# Patient Record
Sex: MALE | Race: WHITE | NOT HISPANIC OR LATINO | Employment: FULL TIME | URBAN - METROPOLITAN AREA
[De-identification: names, ages, dates, MRNs, and addresses within clinical notes are randomized per-mention and may not be internally consistent; named-entity substitution may affect disease eponyms.]

---

## 2017-11-11 ENCOUNTER — HOSPITAL ENCOUNTER (EMERGENCY)
Facility: CLINIC | Age: 27
Discharge: HOME | End: 2017-11-11
Attending: EMERGENCY MEDICINE | Admitting: EMERGENCY MEDICINE
Payer: COMMERCIAL

## 2017-11-11 VITALS
SYSTOLIC BLOOD PRESSURE: 153 MMHG | RESPIRATION RATE: 16 BRPM | BODY MASS INDEX: 27.59 KG/M2 | HEIGHT: 74 IN | WEIGHT: 215 LBS | HEART RATE: 80 BPM | DIASTOLIC BLOOD PRESSURE: 58 MMHG | TEMPERATURE: 98 F | OXYGEN SATURATION: 98 %

## 2017-11-11 DIAGNOSIS — S01.81XA FACIAL LACERATION, INITIAL ENCOUNTER: ICD-10-CM
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
## 2017-11-11 PROCEDURE — 12052 INTMD RPR FACE/MM 2.6-5.0 CM: CPT | Performed by: EMERGENCY MEDICINE

## 2017-11-11 PROCEDURE — 12052 INTMD RPR FACE/MM 2.6-5.0 CM: CPT | Mod: Z6 | Performed by: EMERGENCY MEDICINE

## 2017-11-11 PROCEDURE — 99282 EMERGENCY DEPT VISIT SF MDM: CPT | Mod: 25 | Performed by: EMERGENCY MEDICINE

## 2017-11-11 RX ORDER — LIDOCAINE HYDROCHLORIDE 10 MG/ML
INJECTION, SOLUTION EPIDURAL; INFILTRATION; INTRACAUDAL; PERINEURAL
Status: DISCONTINUED
Start: 2017-11-11 | End: 2017-11-11 | Stop reason: HOSPADM

## 2017-11-11 RX ORDER — MAGNESIUM HYDROXIDE 1200 MG/15ML
LIQUID ORAL
Status: DISCONTINUED
Start: 2017-11-11 | End: 2017-11-11 | Stop reason: HOSPADM

## 2017-11-11 NOTE — ED PROVIDER NOTES
"    [place] EMERGENCY DEPARTMENT ([facility])    History     Chief Complaint   Patient presents with     Facial Laceration     HPI  Kwasi Castellanos is a 27 year old otherwise healthy male who presents for evaluation of a facial laceration. Patient reports he was playing rugby around 2 PM when he took a hit from another player and sustained a laceration to the right side of his face, just lateral to the corner of his right eye. Immediately after the injury the patient did have clotting powder placed on the laceration. He denies neck pain, headache, vision changes, chest pain. No focal neurologic deficits. He does have a contusion inferior to his right eye where he was hit, but denies any other injuries. Patient states his tetanus immunization was updated within the last 1-2 years while in the [employer].     I have reviewed the Medications, Allergies, Past Medical and Surgical History, and Social History in the Epic system.     Allergies   Allergen Reactions     Penicillins      PMHx: Reviewed with patient and is negative.    Family Hx: Reviewed with patient and is non-contributory to today's encounter.    Social History   Substance Use Topics     Smoking status: Never Smoker     Smokeless tobacco: Not on file     Alcohol use Yes       Review of Systems  ROS: 14 point ROS neg other than the symptoms noted above in the HPI.    Physical Exam   BP: 153/58  Pulse: 80  Temp: 98  F (36.7  C)  Resp: 16  Height: 188 cm (6' 2\")  Weight: 97.5 kg (215 lb)  SpO2: 98 %    Physical Exam  GENERAL: Well-appearing, no acute distress.  HENT:    Head: Normocephalic. 3.25cm laceration to the right face just lateral to the right eye/lateral canthal fold without lid involvement.   Ears: External ears normal, hearing grossly intact.   Nose: No external deformities.   Throat/Mouth: Moist oral mucosa. Posterior oropharynx is clear.  EYES: Pupils equal, round, reactive. No conjunctival pallor, sclerae clear. EOMI.  CV: Regular rate, " regular rhythm. Warm and well perfused.  PULMONARY: Effort normal. No accessory muscle use. Good air movement. No stridor. Lung sounds clear bilaterally with no wheezing, rhonchi, or rales.  GI: Soft, non-distended, non-tender to palpation.  NEURO: Alert, awake. Oriented x3. No focal sensory loss or muscular weakness. No facial drooping, no slurring of speech.  MSK:    Neck: Supple.   Extremities: No gross deformity. Coordinated movement of all extremities.  INTEGUMENTARY: Warm. No diaphoresis. No rashes, jaundice, or ecchymoses. Laceration on face as noted above.  PSYCH: Appropriate affect. Behavior is normal. Linear thought process. Normal insight.    ED Course     Procedures  5:47 PM  The patient was seen and examined by Dr. Trujillo in Room 15.   [facility] Procedure Note        Laceration Repair:    Performed by: Brien Trujillo  Authorized by: Brien Trujillo  Consent given by: Patient who states understanding of the procedure being performed after discussing the risks, benefits and alternatives.    Preparation: Patient was prepped and draped in usual sterile fashion.  Irrigation solution: saline    Body area:face  Laceration length: 3.25cm  Contamination: The wound is not contaminated.  Foreign bodies:none  Tendon involvement: none  Anesthesia: Local  Local anesthetic: Lidocaine     1%  Anesthetic total: 10ml    Debridement: none  Skin closure: Closed with 6 x 6.0 Ethilon  Technique: interrupted  Approximation: close  Approximation difficulty: intermediate (layered closure or heavily contaminated)    Patient tolerance: Patient tolerated the procedure well with no immediate complications.  Critical Care time:  None    Assessments & Plan (with Medical Decision Making)   Primary Evaluation:  Patient was seen and examined in the Emergency Department and was noted to be in no acute distress. Initial vital signs demonstrated HTN. Airway was patent and protected. Breathing was intact.  Hemodynamics were stable.    Patient's history was reviewed in the chart and with the patient.    MDM and Disposition:  Patient presented for evaluation of a right face laceration sustained a few hours ago while playing rugby. Laceration was noted just lateral to the right eye without eye or lid involvement. The patient had no LOC or other concerning signs/symptoms that would indicate the presence of intracranial pathology. No imaging was felt to be indicated. The patient's tetanus was updated 1-2 years ago while the patient was in the .    The wound was anesthetized, irrigated, explored, and repaired as documented above. There were no complications or difficulties. VA was intact, as were EOM and pupillary response. No evidence of hyphema.    Given the patient's clinical history, physical exam, and results of our diagnostic work-up, the decision was made to discharge the patient to home in good condition after all results were discussed and all questions were answered. Discussed the plan with the patient, including complications and follow up. Strict return to ED precautions were given. All who were present expressed understanding and agreement with the diagnosis, treatment, and plan.  The patient is to follow up with his PCP or the ED in 5 days for suture removal.    Patient remained hemodynamically stable throughout his stay in the Emergency Department.    Final Clinical Impression:  Facial laceration    I have reviewed the nursing notes.  I have reviewed the findings, diagnosis, plan and need for follow up with the patient.  Brien Trujillo DO  Emergency Medicine  Pager: [phone]    Final diagnoses:   Facial laceration, initial encounter   MERLINE, Larissa Thomas, am serving as a trained medical scribe to document services personally performed by Papa Dial DO, based on the provider's statements to me.   Papa RICK DO, was physically present and have reviewed and verified the  accuracy of this note documented by Larissa Thomas.      11/11/2017   [facility], [place], EMERGENCY DEPARTMENT     Brien Trujillo MD  12/04/17 5212

## 2017-11-11 NOTE — ED AVS SNAPSHOT
[facility], [place], Emergency Department    [address] [address]    Phone:  [phone]                                       Kwasi Castellanos   MRN: [id number]    Department:  [facility], Emergency Department   Date of Visit:  11/11/2017           After Visit Summary Signature Page     I have received my discharge instructions, and my questions have been answered. I have discussed any challenges I see with this plan with the nurse or doctor.    ..........................................................................................................................................  Patient/Patient Representative Signature      ..........................................................................................................................................  Patient Representative Print Name and Relationship to Patient    ..................................................               ................................................  Date                                            Time    ..........................................................................................................................................  Reviewed by Signature/Title    ...................................................              ..............................................  Date                                                            Time

## 2017-11-11 NOTE — ED NOTES
"Triage Assessment & Note:    /58  Pulse 80  Temp 98  F (36.7  C) (Oral)  Resp 16  Ht 1.88 m (6' 2\")  Wt 97.5 kg (215 lb)  SpO2 98%  BMI 27.6 kg/m2    Patient presents with: c/o laceration lateral to the right eye. PT states he was playing rugby and was hit in the head by a head. PT denies LOC. PT also reports having clotting powder placed on the wound, bleeding is controlled without intervention at this time. PT is A&Ox4 ABC's intact.     Home Treatments/Remedies: None    Febrile / Afebrile? Afebrile    Duration of C/o: 1500 on set    Maxwell Goode  November 11, 2017      "

## 2017-11-12 NOTE — DISCHARGE INSTRUCTIONS
*LACERATION (All: sutures, staples, tape, glue)  A laceration is a cut through the skin. This will usually require stitches (sutures) or staples if it is deep. Minor cuts may be treated with a tape closure ( Steri-Strips ) or Dermabond skin glue.    HOME CARE:  1. EXTREMITY, FACE or TRUNK WOUNDS: Keep the wound clean and dry. If a bandage was applied and it becomes wet or dirty, replace it. Otherwise, leave it in place for the first 24 hours.    If stitches or staples were used, clean the wound daily. Protect the wound from sunlight and tanning lamps.    After removing the bandage, wash the area with soap and water. Use a wet cotton swab (Q tip) to loosen and remove any blood or crust that forms.    After cleaning, apply a thin layer of Polysporin or Bacitracin ointment. This will keep the wound clean and make it easier to remove the stitches or staples. Reapply a fresh bandage.    You may remove the bandage to shower as usual after the first 24 hours, but do not soak the area in water (no swimming) until the stitches or staples are removed.    If Steri-Strips were used, keep the area clean and dry. If it becomes wet, blot it dry with a towel. It is okay to take a brief shower, but avoid scrubbing the area.    If Dermabond skin adhesive was used, do not scratch, rub or pick at the adhesive film. Do not place tape directly over the film. Do not apply liquid, ointment or creams to the wound while the film is in place. Do not clean the wound with peroxide and do not apply ointments. Avoid activities that cause heavy sweating until the film has fallen off. Protect the wound from prolonged exposure to sunlight or tanning lamps. You may shower as usual but do not soak the wound in water (no baths or swimming). The film will fall off by itself in 5-10 days.  2. SCALP WOUNDS: During the first two days, you may carefully rinse your hair in the shower to remove blood, glass or dirt particles. After two days, you may  shower and shampoo your hair normally. Do not soak your scalp in the tub or go swimming until the stitches or staples have been removed.  3. MOUTH WOUNDS: Eat soft foods to reduce pain. If the cut is inside of your mouth, clean by rinsing after each meal and at bedtime with a mixture of equal parts water and Hydrogen Peroxide (do not swallow!). Or, you can use a cotton swab to directly apply Hydrogen Peroxide onto the cut.  4. You may use acetaminophen (Tylenol) 650-1000 mg every 6 hours or ibuprofen (Motrin, Advil) 600 mg every 6-8 hours with food to control pain, if you are able to take these medicines. [NOTE: If you have chronic liver or kidney disease or ever had a stomach ulcer or GI bleeding, talk with your doctor before using these medicines.]  Use sunscreen on the area for 6 months after the wound heals to keep the scar from getting darker.   FOLLOW UP: Most skin wounds heal within ten days. Mouth and facial wounds heal within five days. However, even with proper treatment, a wound infection may sometimes occur. Therefore, you should check the wound daily for signs of infection listed below.  Stitches should be removed from the face within five days. Unless you are told to come back to the emergency room, you may have your doctor or urgent care remove the stitches. If dissolving stitches were used in the mouth, these will fall out or dissolve without the need for removal. If tape closures ( Steri-Strips ) were used, remove them yourself if they have not fallen off after 7 days. If Dermabond skin glue was used, the film will fall off by itself in 5-10 days.   GET PROMPT MEDICAL ATTENTION if any of the following occur:    Increasing pain in the wound    Redness, swelling or pus coming from the wound    Fever over 101 F (38.3 C) oral    If stitches or staples come apart or fall out or if Steri-Strips fall off before seven days    If the wound edges re-open    Bleeding not controlled by direct pressure     7747-4091 [employer], [address] [place] [address], [place] [address]. All rights reserved. This information is not intended as a substitute for professional medical care. Always follow your healthcare professional's instructions.

## 2021-09-30 ENCOUNTER — HOSPITAL ENCOUNTER (EMERGENCY)
Facility: CLINIC | Age: 31
Discharge: HOME | End: 2021-09-30
Attending: EMERGENCY MEDICINE | Admitting: EMERGENCY MEDICINE
Payer: OTHER MISCELLANEOUS

## 2021-09-30 VITALS
RESPIRATION RATE: 16 BRPM | DIASTOLIC BLOOD PRESSURE: 95 MMHG | SYSTOLIC BLOOD PRESSURE: 112 MMHG | HEART RATE: 76 BPM | BODY MASS INDEX: 29.52 KG/M2 | HEIGHT: 74 IN | WEIGHT: 230 LBS | OXYGEN SATURATION: 98 % | TEMPERATURE: 100.2 F

## 2021-09-30 DIAGNOSIS — S71.151A: ICD-10-CM

## 2021-09-30 DIAGNOSIS — S51.851A: ICD-10-CM

## 2021-09-30 DIAGNOSIS — R53.83 FATIGUE: ICD-10-CM
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
## 2021-09-30 PROCEDURE — 99283 EMERGENCY DEPT VISIT LOW MDM: CPT

## 2021-09-30 PROCEDURE — 99284 EMERGENCY DEPT VISIT MOD MDM: CPT | Performed by: EMERGENCY MEDICINE

## 2021-09-30 RX ORDER — SULFAMETHOXAZOLE/TRIMETHOPRIM 800-160 MG
1 TABLET ORAL 2 TIMES DAILY
Qty: 14 TABLET | Refills: 0 | Status: ON HOLD | OUTPATIENT
Start: 2021-09-30 | End: 2021-10-11

## 2021-09-30 RX ORDER — CLINDAMYCIN HCL 150 MG
450 CAPSULE ORAL 3 TIMES DAILY
Qty: 63 CAPSULE | Refills: 0 | Status: ON HOLD | OUTPATIENT
Start: 2021-09-30 | End: 2021-10-11

## 2021-09-30 ASSESSMENT — ENCOUNTER SYMPTOMS
DIARRHEA: 1
RHINORRHEA: 0
SORE THROAT: 0
FEVER: 1
HEADACHES: 1
MYALGIAS: 1
DYSURIA: 0
WEAKNESS: 1
BLOOD IN STOOL: 0

## 2021-09-30 ASSESSMENT — MIFFLIN-ST. JEOR: SCORE: 2078.02

## 2021-09-30 NOTE — DISCHARGE INSTRUCTIONS
Pt called in worried because she took tomorrow mornings pills instead of todays afternoon pills, she wasn't having any sx but was just wanting to be cautious.l Dr. Mendez said that most of the meds she took is suppose to be twice a day anyways she just took them a couple hours earlier and that she more than likely would be fine. I told her that if she feels anything to lie down or if It became unbearable to go to the ER. Also advised her to not take anymore cardiac meds this afternoon.  
Thank you for coming to the [facility] Emergency Department.     Please return to the ER if worsening in the next few days, with high fevers, shaking chills, weakness despite starting antibiotics.     OK to use over the counter tylenol or ibuprofen according to package instructions for fever, headache or body aches.   
162.56

## 2021-09-30 NOTE — ED TRIAGE NOTES
Triage Assessment & Note:    Patient presents with: PT reports he was bit by a dog 6 days ago on the right arm and right leg. PT reports dog was up to date on shots. Pt today reports fever body aches and fatigue. PT is vaccinated for Covid.     Home Treatments/Remedies: None    Febrile / Afebrile? febrile     Duration of C/o: 3 days     Maxwell Goode RN  September 30, 2021

## 2021-09-30 NOTE — ED PROVIDER NOTES
[place] EMERGENCY DEPARTMENT ([facility])  9/30/21    History     Chief Complaint   Patient presents with     Dog Bite     The history is provided by the patient.     Kwasi Castellanos is a 31 year old male who presents to the Emergency Department with generalized weakness and fatigue. 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 Patient reports he then went out of town to play rugby and began feeling generally sore the next few days, thinking this was due to playing rugby. He states he continued feeling increased soreness. Patient reports subjective fever beginning Wednesday, 9/29. He then began feeling generally weak. Patient reports he got a COVID test and this was negative. Patient reports his manager went and checked to make sure the dog was up to date on its vaccinations. Patient denies drainage from site of bites. Patient endorses headache. No sore throat, rhinorrhea, or congestion. Patient notes some diarrhea. No blood in the stool. No dysuria. Patient reports receiving his last Tdap within the past 10 years while he was in the [employer].    Past Medical History  No past medical history on file.  No past surgical history on file.  clindamycin (CLEOCIN) 150 MG capsule  sulfamethoxazole-trimethoprim (BACTRIM DS) 800-160 MG tablet      Allergies   Allergen Reactions     Penicillins Hives     Family History  No family history on file.  Social History   Social History     Tobacco Use     Smoking status: Never Smoker     Smokeless tobacco: Never Used   Substance Use Topics     Alcohol use: Not on file     Drug use: Not on file      Past medical history, past surgical history, medications, allergies, family history, and social history were reviewed with the patient. No additional pertinent items.       Review of Systems   Constitutional: Positive for fever.   HENT: Negative for  "congestion, rhinorrhea and sore throat.    Gastrointestinal: Positive for diarrhea. Negative for blood in stool.   Genitourinary: Negative for dysuria.   Musculoskeletal: Positive for myalgias.   Neurological: Positive for weakness (generalized) and headaches.   All other systems reviewed and are negative.    A complete review of systems was performed with pertinent positives and negatives noted in the HPI, and all other systems negative.    Physical Exam   BP: (!) 112/95  Pulse: 76  Temp: 100.2  F (37.9  C)  Resp: 16  Height: 188 cm (6' 2\")  Weight: 104.3 kg (230 lb)  SpO2: 98 %     Physical Exam  Gen:A&Ox3, no acute distress  HEENT:PERRL, no facial tenderness or wounds, head atraumatic, oropharynx clear, mucous membranes moist, TMs clear bilaterally  Neck:no bony tenderness or step offs, no JVD, trachea midline, no cervical lymphadenopathy  Back: no CVA tenderness, no midline bony tenderness  CV:RRR without murmurs  PULM:Clear to auscultation bilaterally  Abd:soft, nontender, nondistended. Bowel sounds present and normal  UE: scabbed bite site right forearm with mild surrounding erythema.  No purulent drainage.  No streaking.  No crepitus or significant muscle tenderness to suggest deep space infection.  Normal perfusion  LE: Right posterior thigh with multiple puncture wounds.  There is associated bruising.  No significant erythema.  No drainage.  No compartment tenderness or crepitus.  Normal perfusion  ED Course   11:21 AM  The patient was seen and examined by Vidhi Massey MD in [facility].      Procedures         No results found for any visits on 09/30/21.  Medications - No data to display     Assessments & Plan (with Medical Decision Making)   32 yo M presenting with fever. Had a recent dog bite without any post-bite infection prophylaxis, but wounds appear to be healing. Forearm wound with mild erythema is likely source. No other localizing infectious symptoms and recent negative COVID-19 test.   Vitals " otherwise stable.  Started on a course of clindamycin and bactrim. Hx of allergy to PCNs.   Discharged with follow up with primary care.     I have reviewed the nursing notes. I have reviewed the findings, diagnosis, plan and need for follow up with the patient.    Discharge Medication List as of 9/30/2021 11:43 AM      START taking these medications    Details   clindamycin (CLEOCIN) 150 MG capsule Take 3 capsules (450 mg) by mouth 3 times daily for 7 days, Disp-63 capsule, R-0, E-Prescribe      sulfamethoxazole-trimethoprim (BACTRIM DS) 800-160 MG tablet Take 1 tablet by mouth 2 times daily for 7 days, Disp-14 tablet, R-0, E-Prescribe             Final diagnoses:   Dog bite, initial encounter     I, Zofia Arthur, am serving as a trained medical scribe to document services personally performed by Vidhi Massey MD, based on the provider's statements to me.      I, Vidhi Massey MD, was physically present and have reviewed and verified the accuracy of this note documented by Zofia Arthur.     --  Vidhi Massey MD [facility] EMERGENCY DEPARTMENT  9/30/2021     Vidhi Massey MD  10/06/21 [id number]

## 2021-10-03 ENCOUNTER — ANCILLARY PROCEDURE (OUTPATIENT)
Dept: GENERAL RADIOLOGY | Facility: CLINIC | Age: 31
End: 2021-10-03
Attending: PHYSICIAN ASSISTANT
Payer: COMMERCIAL

## 2021-10-03 ENCOUNTER — OFFICE VISIT (OUTPATIENT)
Dept: URGENT CARE | Facility: URGENT CARE | Age: 31
End: 2021-10-03
Payer: COMMERCIAL

## 2021-10-03 VITALS
TEMPERATURE: 98.4 F | SYSTOLIC BLOOD PRESSURE: 116 MMHG | DIASTOLIC BLOOD PRESSURE: 71 MMHG | BODY MASS INDEX: 29.53 KG/M2 | OXYGEN SATURATION: 99 % | WEIGHT: 230 LBS | HEART RATE: 59 BPM

## 2021-10-03 DIAGNOSIS — R10.2 PERINEUM PAIN, MALE: Primary | ICD-10-CM

## 2021-10-03 DIAGNOSIS — R10.2 PERINEUM PAIN, MALE: ICD-10-CM

## 2021-10-03 LAB
ALBUMIN UR-MCNC: NEGATIVE MG/DL
APPEARANCE UR: CLEAR
BASOPHILS # BLD AUTO: 0 10E3/UL (ref 0–0.2)
BASOPHILS NFR BLD AUTO: 0 %
BILIRUB UR QL STRIP: NEGATIVE
COLOR UR AUTO: YELLOW
EOSINOPHIL # BLD AUTO: 0.3 10E3/UL (ref 0–0.7)
EOSINOPHIL NFR BLD AUTO: 3 %
ERYTHROCYTE [DISTWIDTH] IN BLOOD BY AUTOMATED COUNT: 12.3 % (ref 10–15)
GLUCOSE UR STRIP-MCNC: NEGATIVE MG/DL
HCT VFR BLD AUTO: 39.6 % (ref 40–53)
HGB BLD-MCNC: 13.5 G/DL (ref 13.3–17.7)
HGB UR QL STRIP: NEGATIVE
IMM GRANULOCYTES # BLD: 0 10E3/UL
IMM GRANULOCYTES NFR BLD: 0 %
KETONES UR STRIP-MCNC: NEGATIVE MG/DL
LEUKOCYTE ESTERASE UR QL STRIP: NEGATIVE
LYMPHOCYTES # BLD AUTO: 1.9 10E3/UL (ref 0.8–5.3)
LYMPHOCYTES NFR BLD AUTO: 23 %
MCH RBC QN AUTO: 30 PG (ref 26.5–33)
MCHC RBC AUTO-ENTMCNC: 34.1 G/DL (ref 31.5–36.5)
MCV RBC AUTO: 88 FL (ref 78–100)
MONOCYTES # BLD AUTO: 0.9 10E3/UL (ref 0–1.3)
MONOCYTES NFR BLD AUTO: 11 %
NEUTROPHILS # BLD AUTO: 5.3 10E3/UL (ref 1.6–8.3)
NEUTROPHILS NFR BLD AUTO: 63 %
NITRATE UR QL: NEGATIVE
PH UR STRIP: 6 [PH] (ref 5–7)
PLATELET # BLD AUTO: 229 10E3/UL (ref 150–450)
RBC # BLD AUTO: 4.5 10E6/UL (ref 4.4–5.9)
SP GR UR STRIP: >=1.03 (ref 1–1.03)
UROBILINOGEN UR STRIP-ACNC: 0.2 E.U./DL
WBC # BLD AUTO: 8.3 10E3/UL (ref 4–11)

## 2021-10-03 PROCEDURE — 81003 URINALYSIS AUTO W/O SCOPE: CPT | Performed by: PHYSICIAN ASSISTANT

## 2021-10-03 PROCEDURE — 99204 OFFICE O/P NEW MOD 45 MIN: CPT | Performed by: PHYSICIAN ASSISTANT

## 2021-10-03 PROCEDURE — 72220 X-RAY EXAM SACRUM TAILBONE: CPT | Performed by: RADIOLOGY

## 2021-10-03 PROCEDURE — 85025 COMPLETE CBC W/AUTO DIFF WBC: CPT | Performed by: PHYSICIAN ASSISTANT

## 2021-10-03 PROCEDURE — 36415 COLL VENOUS BLD VENIPUNCTURE: CPT | Performed by: PHYSICIAN ASSISTANT

## 2021-10-03 NOTE — PATIENT INSTRUCTIONS
Patient Education     Understanding Coccydynia    Coccydynia is pain at the lowest tip of the spine (the coccyx, or tailbone). This is sometimes called a  bruised tailbone.  Tailbone pain can be very uncomfortable. It can also interfere with daily activities, such as driving.    How to say it  miguel angel lu  What causes coccydynia?   Causes of tailbone pain include:    Injury to the tailbone from a blow or fall    A bone spur on the tailbone    Poor posture while sitting    Sitting for a long time in an uncomfortable position    Vaginal childbirth    Arthritis  In some cases, the cause of the pain can t be found.   Symptoms of coccydynia   You may feel:     A dull ache or sharp pain in the tailbone area, near the top of the buttocks    Muscle spasms in the lower back or pelvic area    A sense of pressure in the rectum  Pain may be triggered by things like walking or standing up from sitting. It may hurt more if you sit for long periods. Things that put pressure on the tailbone, such as riding a horse or having sex, may also trigger the pain.   Treatment for coccydynia   Tailbone pain often goes away by itself within a few months. Treatment focuses on reducing pain and protecting the tailbone. Treatments can include:     Over-the-counter or prescription medicine to help relieve pain and swelling. NSAIDs (nonsteroidal anti-inflammatory drugs) are the most common medicines used. Medicines may be prescribed or bought over the counter. They may be given as pills. Or they may be put on the skin as a gel, cream, or patch.    Warm or cold to help relieve symptoms for a time, such as a heating pad, hot water bottle, or ice pack    Keeping pressure off the tailbone to help the area heal by shifting weight forward onto your hipbones and thighs when sitting or sitting on a special cushion    Medicine injected into the area to help relieve severe symptoms. The medicine is usually a corticosteroid. This is a strong  anti-inflammatory medicine.    Physical therapy to help strengthen the structures around the tailbone  If no other treatments work, you may need surgery to remove all or part of the coccyx.   When to call your healthcare provider   Call your healthcare provider right away if you have any of these:     Pain that continues for more than 2 months or gets worse    Pain that limits your usual activities    Pain that doesn t get better by trying the self-care treatments described above    Fever of 100.4 F (38 C) or higher, or as directed by your provider    Chills    Redness or swelling    A new sore in the cleft of the buttocks, especially one that contains or drains pus    Other new symptoms  Vicki last reviewed this educational content on 6/1/2019 2000-2021 [employer]. All rights reserved. This information is not intended as a substitute for professional medical care. Always follow your healthcare professional's instructions.

## 2021-10-03 NOTE — PROGRESS NOTES
SUBJECTIVE:  Chief Complaint   Patient presents with     Urgent Care     Tailbone Pain     c/o dog bite pain for 1 week     Kwasi Castellanos is a 31 year old male presents with a chief complaint of perineum pain.  Not present at rest.  Aggravated with sitting up and standing up. No defacation symptoms, rectal symptoms, genitourinary symptoms.     [employer] player.    Bit by dog and on ABs currently    Tetanus up to date forr pt    Dog up to date on vaccines    Immunization History   Administered Date(s) Administered     COVID-19,PF,Jose 03/14/2021         No past medical history on file.  Current Outpatient Medications   Medication Sig Dispense Refill     clindamycin (CLEOCIN) 150 MG capsule Take 3 capsules (450 mg) by mouth 3 times daily for 7 days 63 capsule 0     sulfamethoxazole-trimethoprim (BACTRIM DS) 800-160 MG tablet Take 1 tablet by mouth 2 times daily for 7 days 14 tablet 0     Social History     Tobacco Use     Smoking status: Never Smoker     Smokeless tobacco: Never Used   Substance Use Topics     Alcohol use: Not on file       ROS:  10 point ROS negative except as listed above      EXAM:   /71   Pulse 59   Temp 98.4  F (36.9  C) (Tympanic)   Wt 104.3 kg (230 lb)   SpO2 99%   BMI 29.53 kg/m    Gen: healthy,alert,no distress  Rectal: no abnormality, prostate nontender  GROIN: no abnormalities  CHEST: clear to auscultation  CV: regular rate and rhythm  SKIN: no suspicious lesions or rashes  NEURO: Normal strength and tone, sensory exam grossly normal, mentation intact and speech normal    XRAY indicates coccyx abnormality.    Results for orders placed or performed in visit on 10/03/21   XR Sacrum and Coccyx 2 Views     Status: None    Narrative    EXAM: XR SACRUM AND COCCYX 2 VIEW  LOCATION: [facility]  DATE/TIME: 10/03/2021, 12:09 PM    INDICATION: Perineum pain, male.  COMPARISON: None.      Impression    IMPRESSION: No acute displaced sacral or coccygeal fracture  identified. Chronic angular deformity near the sacrococcygeal junction apex posteriorly. Normal and symmetric sacroiliac and hip joint spaces. Sacral arcuate lines appear intact. Both obturator   rings intact.       Results for orders placed or performed in visit on 10/03/21   UA Macro with Reflex to Micro and Culture - lab collect     Status: Normal    Specimen: Urine, Midstream   Result Value Ref Range    Color Urine Yellow Colorless, Straw, Light Yellow, Yellow    Appearance Urine Clear Clear    Glucose Urine Negative Negative mg/dL    Bilirubin Urine Negative Negative    Ketones Urine Negative Negative mg/dL    Specific Gravity Urine >=1.030 1.003 - 1.035    Blood Urine Negative Negative    pH Urine 6.0 5.0 - 7.0    Protein Albumin Urine Negative Negative mg/dL    Urobilinogen Urine 0.2 0.2, 1.0 E.U./dL    Nitrite Urine Negative Negative    Leukocyte Esterase Urine Negative Negative    Narrative    Microscopic not indicated   CBC with platelets and differential     Status: Abnormal   Result Value Ref Range    WBC Count 8.3 4.0 - 11.0 10e3/uL    RBC Count 4.50 4.40 - 5.90 10e6/uL    Hemoglobin 13.5 13.3 - 17.7 g/dL    Hematocrit 39.6 (L) 40.0 - 53.0 %    MCV 88 78 - 100 fL    MCH 30.0 26.5 - 33.0 pg    MCHC 34.1 31.5 - 36.5 g/dL    RDW 12.3 10.0 - 15.0 %    Platelet Count 229 150 - 450 10e3/uL    % Neutrophils 63 %    % Lymphocytes 23 %    % Monocytes 11 %    % Eosinophils 3 %    % Basophils 0 %    % Immature Granulocytes 0 %    Absolute Neutrophils 5.3 1.6 - 8.3 10e3/uL    Absolute Lymphocytes 1.9 0.8 - 5.3 10e3/uL    Absolute Monocytes 0.9 0.0 - 1.3 10e3/uL    Absolute Eosinophils 0.3 0.0 - 0.7 10e3/uL    Absolute Basophils 0.0 0.0 - 0.2 10e3/uL    Absolute Immature Granulocytes 0.0 <=0.0 10e3/uL   CBC with platelets and differential     Status: Abnormal    Narrative    The following orders were created for panel order CBC with platelets and differential.  Procedure                               Abnormality          Status                     ---------                               -----------         ------                     CBC with platelets and d...[[id number]]  Abnormal            Final result                 Please view results for these tests on the individual orders.         ASSESSMENT:   (R10.2) Perineum pain, male  (primary encounter diagnosis)  Comment: cortney musculoskeletal, perhaps due to tailbone abnormality  Plan: XR Sacrum and Coccyx 2 Views, UA Macro with         Reflex to Micro and Culture - lab collect, CBC         with platelets and differential, Orthopedic          Referral  RICE, follow up with sports medicine and perhaps PT    Patient Instructions     Patient Education     Understanding Coccydynia    Coccydynia is pain at the lowest tip of the spine (the coccyx, or tailbone). This is sometimes called a  bruised tailbone.  Tailbone pain can be very uncomfortable. It can also interfere with daily activities, such as driving.    How to say it  miguel angel si DIN ee uh  What causes coccydynia?   Causes of tailbone pain include:    Injury to the tailbone from a blow or fall    A bone spur on the tailbone    Poor posture while sitting    Sitting for a long time in an uncomfortable position    Vaginal childbirth    Arthritis  In some cases, the cause of the pain can t be found.   Symptoms of coccydynia   You may feel:     A dull ache or sharp pain in the tailbone area, near the top of the buttocks    Muscle spasms in the lower back or pelvic area    A sense of pressure in the rectum  Pain may be triggered by things like walking or standing up from sitting. It may hurt more if you sit for long periods. Things that put pressure on the tailbone, such as riding a horse or having sex, may also trigger the pain.   Treatment for coccydynia   Tailbone pain often goes away by itself within a few months. Treatment focuses on reducing pain and protecting the tailbone. Treatments can include:     Over-the-counter or  prescription medicine to help relieve pain and swelling. NSAIDs (nonsteroidal anti-inflammatory drugs) are the most common medicines used. Medicines may be prescribed or bought over the counter. They may be given as pills. Or they may be put on the skin as a gel, cream, or patch.    Warm or cold to help relieve symptoms for a time, such as a heating pad, hot water bottle, or ice pack    Keeping pressure off the tailbone to help the area heal by shifting weight forward onto your hipbones and thighs when sitting or sitting on a special cushion    Medicine injected into the area to help relieve severe symptoms. The medicine is usually a corticosteroid. This is a strong anti-inflammatory medicine.    Physical therapy to help strengthen the structures around the tailbone  If no other treatments work, you may need surgery to remove all or part of the coccyx.   When to call your healthcare provider   Call your healthcare provider right away if you have any of these:     Pain that continues for more than 2 months or gets worse    Pain that limits your usual activities    Pain that doesn t get better by trying the self-care treatments described above    Fever of 100.4 F (38 C) or higher, or as directed by your provider    Chills    Redness or swelling    A new sore in the cleft of the buttocks, especially one that contains or drains pus    Other new symptoms  [employer] last reviewed this educational content on 6/1/2019 2000-2021 [employer]. All rights reserved. This information is not intended as a substitute for professional medical care. Always follow your healthcare professional's instructions.

## 2021-10-07 ENCOUNTER — APPOINTMENT (OUTPATIENT)
Dept: GENERAL RADIOLOGY | Facility: CLINIC | Age: 31
DRG: 728 | End: 2021-10-07
Attending: EMERGENCY MEDICINE
Payer: COMMERCIAL

## 2021-10-07 ENCOUNTER — HOSPITAL ENCOUNTER (INPATIENT)
Facility: CLINIC | Age: 31
LOS: 3 days | Discharge: HOME | DRG: 728 | End: 2021-10-11
Attending: EMERGENCY MEDICINE | Admitting: PEDIATRICS
Payer: COMMERCIAL

## 2021-10-07 DIAGNOSIS — Z11.52 ENCOUNTER FOR SCREENING LABORATORY TESTING FOR SEVERE ACUTE RESPIRATORY SYNDROME CORONAVIRUS 2 (SARS-COV-2): ICD-10-CM

## 2021-10-07 DIAGNOSIS — R50.9 FEVER, UNSPECIFIED FEVER CAUSE: Primary | ICD-10-CM

## 2021-10-07 DIAGNOSIS — N41.0 ACUTE PROSTATITIS: ICD-10-CM

## 2021-10-07 LAB
ALBUMIN SERPL-MCNC: 3.7 G/DL (ref 3.4–5)
ALP SERPL-CCNC: 92 U/L (ref 40–150)
ALT SERPL W P-5'-P-CCNC: 37 U/L (ref 0–70)
ANION GAP SERPL CALCULATED.3IONS-SCNC: 6 MMOL/L (ref 3–14)
AST SERPL W P-5'-P-CCNC: 28 U/L (ref 0–45)
BILIRUB SERPL-MCNC: 0.7 MG/DL (ref 0.2–1.3)
BUN SERPL-MCNC: 18 MG/DL (ref 7–30)
CALCIUM SERPL-MCNC: 9 MG/DL (ref 8.5–10.1)
CHLORIDE BLD-SCNC: 106 MMOL/L (ref 94–109)
CO2 SERPL-SCNC: 26 MMOL/L (ref 20–32)
CREAT SERPL-MCNC: 1.75 MG/DL (ref 0.66–1.25)
CRP SERPL-MCNC: 50 MG/L (ref 0–8)
ERYTHROCYTE [SEDIMENTATION RATE] IN BLOOD BY WESTERGREN METHOD: 17 MM/HR (ref 0–15)
GFR SERPL CREATININE-BSD FRML MDRD: 51 ML/MIN/1.73M2
GLUCOSE BLD-MCNC: 103 MG/DL (ref 70–99)
HOLD SPECIMEN: NORMAL
POTASSIUM BLD-SCNC: 3.6 MMOL/L (ref 3.4–5.3)
PROT SERPL-MCNC: 7.6 G/DL (ref 6.8–8.8)
SARS-COV-2 RNA RESP QL NAA+PROBE: NEGATIVE
SODIUM SERPL-SCNC: 138 MMOL/L (ref 133–144)

## 2021-10-07 PROCEDURE — 71046 X-RAY EXAM CHEST 2 VIEWS: CPT

## 2021-10-07 PROCEDURE — 87149 DNA/RNA DIRECT PROBE: CPT | Performed by: EMERGENCY MEDICINE

## 2021-10-07 PROCEDURE — U0003 INFECTIOUS AGENT DETECTION BY NUCLEIC ACID (DNA OR RNA); SEVERE ACUTE RESPIRATORY SYNDROME CORONAVIRUS 2 (SARS-COV-2) (CORONAVIRUS DISEASE [COVID-19]), AMPLIFIED PROBE TECHNIQUE, MAKING USE OF HIGH THROUGHPUT TECHNOLOGIES AS DESCRIBED BY CMS-2020-01-R: HCPCS | Performed by: EMERGENCY MEDICINE

## 2021-10-07 PROCEDURE — 99285 EMERGENCY DEPT VISIT HI MDM: CPT | Performed by: EMERGENCY MEDICINE

## 2021-10-07 PROCEDURE — 99285 EMERGENCY DEPT VISIT HI MDM: CPT | Mod: 25 | Performed by: EMERGENCY MEDICINE

## 2021-10-07 PROCEDURE — 87040 BLOOD CULTURE FOR BACTERIA: CPT | Performed by: EMERGENCY MEDICINE

## 2021-10-07 PROCEDURE — 85025 COMPLETE CBC W/AUTO DIFF WBC: CPT | Performed by: EMERGENCY MEDICINE

## 2021-10-07 PROCEDURE — 86140 C-REACTIVE PROTEIN: CPT | Performed by: EMERGENCY MEDICINE

## 2021-10-07 PROCEDURE — 36415 COLL VENOUS BLD VENIPUNCTURE: CPT | Performed by: EMERGENCY MEDICINE

## 2021-10-07 PROCEDURE — 85652 RBC SED RATE AUTOMATED: CPT | Performed by: EMERGENCY MEDICINE

## 2021-10-07 PROCEDURE — 96361 HYDRATE IV INFUSION ADD-ON: CPT | Performed by: EMERGENCY MEDICINE

## 2021-10-07 PROCEDURE — G0103 PSA SCREENING: HCPCS | Performed by: PEDIATRICS

## 2021-10-07 PROCEDURE — 83605 ASSAY OF LACTIC ACID: CPT

## 2021-10-07 PROCEDURE — 258N000003 HC RX IP 258 OP 636: Performed by: EMERGENCY MEDICINE

## 2021-10-07 PROCEDURE — C9803 HOPD COVID-19 SPEC COLLECT: HCPCS | Performed by: EMERGENCY MEDICINE

## 2021-10-07 PROCEDURE — 82803 BLOOD GASES ANY COMBINATION: CPT

## 2021-10-07 PROCEDURE — 80053 COMPREHEN METABOLIC PANEL: CPT | Performed by: EMERGENCY MEDICINE

## 2021-10-07 RX ORDER — IBUPROFEN 200 MG
600 TABLET ORAL EVERY 4 HOURS PRN
Status: ON HOLD | COMMUNITY
End: 2021-10-11

## 2021-10-07 RX ORDER — SODIUM CHLORIDE 9 MG/ML
INJECTION, SOLUTION INTRAVENOUS CONTINUOUS
Status: DISCONTINUED | OUTPATIENT
Start: 2021-10-07 | End: 2021-10-08

## 2021-10-07 RX ADMIN — SODIUM CHLORIDE 1000 ML: 9 INJECTION, SOLUTION INTRAVENOUS at 23:21

## 2021-10-07 ASSESSMENT — ENCOUNTER SYMPTOMS
LIGHT-HEADEDNESS: 1
FEVER: 1
NAUSEA: 1
VOMITING: 1
MYALGIAS: 1
SHORTNESS OF BREATH: 1
COUGH: 0

## 2021-10-08 ENCOUNTER — APPOINTMENT (OUTPATIENT)
Dept: GENERAL RADIOLOGY | Facility: CLINIC | Age: 31
DRG: 728 | End: 2021-10-08
Attending: STUDENT IN AN ORGANIZED HEALTH CARE EDUCATION/TRAINING PROGRAM
Payer: COMMERCIAL

## 2021-10-08 ENCOUNTER — APPOINTMENT (OUTPATIENT)
Dept: CT IMAGING | Facility: CLINIC | Age: 31
DRG: 728 | End: 2021-10-08
Attending: EMERGENCY MEDICINE
Payer: COMMERCIAL

## 2021-10-08 ENCOUNTER — APPOINTMENT (OUTPATIENT)
Dept: MRI IMAGING | Facility: CLINIC | Age: 31
DRG: 728 | End: 2021-10-08
Attending: EMERGENCY MEDICINE
Payer: COMMERCIAL

## 2021-10-08 PROBLEM — N41.0 ACUTE PROSTATITIS: Status: ACTIVE | Noted: 2021-10-08

## 2021-10-08 LAB
ALBUMIN UR-MCNC: 20 MG/DL
ANION GAP SERPL CALCULATED.3IONS-SCNC: 3 MMOL/L (ref 3–14)
APPEARANCE UR: CLEAR
BASOPHILS # BLD AUTO: 0 10E3/UL (ref 0–0.2)
BASOPHILS NFR BLD AUTO: 0 %
BILIRUB UR QL STRIP: NEGATIVE
BUN SERPL-MCNC: 16 MG/DL (ref 7–30)
C TRACH DNA SPEC QL NAA+PROBE: NEGATIVE
CALCIUM SERPL-MCNC: 8 MG/DL (ref 8.5–10.1)
CHLORIDE BLD-SCNC: 108 MMOL/L (ref 94–109)
CO2 SERPL-SCNC: 25 MMOL/L (ref 20–32)
COLOR UR AUTO: ABNORMAL
CREAT SERPL-MCNC: 1.54 MG/DL (ref 0.66–1.25)
EOSINOPHIL # BLD AUTO: 0 10E3/UL (ref 0–0.7)
EOSINOPHIL NFR BLD AUTO: 0 %
ERYTHROCYTE [DISTWIDTH] IN BLOOD BY AUTOMATED COUNT: 12.1 % (ref 10–15)
GFR SERPL CREATININE-BSD FRML MDRD: 60 ML/MIN/1.73M2
GLUCOSE BLD-MCNC: 120 MG/DL (ref 70–99)
GLUCOSE UR STRIP-MCNC: NEGATIVE MG/DL
HCO3 BLDV-SCNC: 26 MMOL/L (ref 21–28)
HCT VFR BLD AUTO: 41.2 % (ref 40–53)
HGB BLD-MCNC: 14 G/DL (ref 13.3–17.7)
HGB UR QL STRIP: NEGATIVE
HOLD SPECIMEN: NORMAL
IMM GRANULOCYTES # BLD: 0.1 10E3/UL
IMM GRANULOCYTES NFR BLD: 1 %
KETONES UR STRIP-MCNC: NEGATIVE MG/DL
LACTATE BLD-SCNC: 2.4 MMOL/L
LACTATE SERPL-SCNC: 1.4 MMOL/L (ref 0.7–2)
LEUKOCYTE ESTERASE UR QL STRIP: NEGATIVE
LYMPHOCYTES # BLD AUTO: 0.3 10E3/UL (ref 0.8–5.3)
LYMPHOCYTES NFR BLD AUTO: 5 %
MCH RBC QN AUTO: 29.6 PG (ref 26.5–33)
MCHC RBC AUTO-ENTMCNC: 34 G/DL (ref 31.5–36.5)
MCV RBC AUTO: 87 FL (ref 78–100)
MONOCYTES # BLD AUTO: 0 10E3/UL (ref 0–1.3)
MONOCYTES NFR BLD AUTO: 1 %
N GONORRHOEA DNA SPEC QL NAA+PROBE: NEGATIVE
NEUTROPHILS # BLD AUTO: 6.1 10E3/UL (ref 1.6–8.3)
NEUTROPHILS NFR BLD AUTO: 93 %
NITRATE UR QL: NEGATIVE
NRBC # BLD AUTO: 0 10E3/UL
NRBC BLD AUTO-RTO: 0 /100
PCO2 BLDV: 32 MM HG (ref 40–50)
PH BLDV: 7.53 [PH] (ref 7.32–7.43)
PH UR STRIP: 7.5 [PH] (ref 5–7)
PLAT MORPH BLD: NORMAL
PLATELET # BLD AUTO: 275 10E3/UL (ref 150–450)
PO2 BLDV: 17 MM HG (ref 25–47)
POTASSIUM BLD-SCNC: 4.3 MMOL/L (ref 3.4–5.3)
PSA SERPL-MCNC: 0.94 UG/L (ref 0–4)
RBC # BLD AUTO: 4.73 10E6/UL (ref 4.4–5.9)
RBC MORPH BLD: NORMAL
RBC URINE: 1 /HPF
RENAL TUB EPI: <1 /HPF
SAO2 % BLDV: 32 % (ref 94–100)
SODIUM SERPL-SCNC: 136 MMOL/L (ref 133–144)
SP GR UR STRIP: 1.01 (ref 1–1.03)
SQUAMOUS EPITHELIAL: 1 /HPF
TRANSITIONAL EPI: 2 /HPF
UROBILINOGEN UR STRIP-MCNC: NORMAL MG/DL
WBC # BLD AUTO: 6.6 10E3/UL (ref 4–11)
WBC URINE: 1 /HPF

## 2021-10-08 PROCEDURE — 36415 COLL VENOUS BLD VENIPUNCTURE: CPT | Performed by: EMERGENCY MEDICINE

## 2021-10-08 PROCEDURE — 87591 N.GONORRHOEAE DNA AMP PROB: CPT | Performed by: EMERGENCY MEDICINE

## 2021-10-08 PROCEDURE — 87389 HIV-1 AG W/HIV-1&-2 AB AG IA: CPT | Performed by: INTERNAL MEDICINE

## 2021-10-08 PROCEDURE — 99222 1ST HOSP IP/OBS MODERATE 55: CPT | Mod: GC | Performed by: INTERNAL MEDICINE

## 2021-10-08 PROCEDURE — 87340 HEPATITIS B SURFACE AG IA: CPT | Performed by: INTERNAL MEDICINE

## 2021-10-08 PROCEDURE — 83605 ASSAY OF LACTIC ACID: CPT | Performed by: EMERGENCY MEDICINE

## 2021-10-08 PROCEDURE — 96365 THER/PROPH/DIAG IV INF INIT: CPT | Performed by: EMERGENCY MEDICINE

## 2021-10-08 PROCEDURE — 250N000011 HC RX IP 250 OP 636: Performed by: EMERGENCY MEDICINE

## 2021-10-08 PROCEDURE — 51610 INJECTION FOR BLADDER X-RAY: CPT | Performed by: STUDENT IN AN ORGANIZED HEALTH CARE EDUCATION/TRAINING PROGRAM

## 2021-10-08 PROCEDURE — 258N000003 HC RX IP 258 OP 636: Performed by: EMERGENCY MEDICINE

## 2021-10-08 PROCEDURE — 99223 1ST HOSP IP/OBS HIGH 75: CPT | Mod: AI | Performed by: PEDIATRICS

## 2021-10-08 PROCEDURE — 72197 MRI PELVIS W/O & W/DYE: CPT

## 2021-10-08 PROCEDURE — 255N000002 HC RX 255 OP 636: Performed by: INTERNAL MEDICINE

## 2021-10-08 PROCEDURE — 86803 HEPATITIS C AB TEST: CPT | Performed by: INTERNAL MEDICINE

## 2021-10-08 PROCEDURE — 255N000002 HC RX 255 OP 636: Performed by: EMERGENCY MEDICINE

## 2021-10-08 PROCEDURE — 250N000009 HC RX 250: Performed by: EMERGENCY MEDICINE

## 2021-10-08 PROCEDURE — 81001 URINALYSIS AUTO W/SCOPE: CPT | Performed by: EMERGENCY MEDICINE

## 2021-10-08 PROCEDURE — 36415 COLL VENOUS BLD VENIPUNCTURE: CPT | Performed by: PEDIATRICS

## 2021-10-08 PROCEDURE — A9585 GADOBUTROL INJECTION: HCPCS | Performed by: EMERGENCY MEDICINE

## 2021-10-08 PROCEDURE — 86780 TREPONEMA PALLIDUM: CPT | Performed by: INTERNAL MEDICINE

## 2021-10-08 PROCEDURE — 86704 HEP B CORE ANTIBODY TOTAL: CPT | Performed by: INTERNAL MEDICINE

## 2021-10-08 PROCEDURE — C1769 GUIDE WIRE: HCPCS | Performed by: UROLOGY

## 2021-10-08 PROCEDURE — 87491 CHLMYD TRACH DNA AMP PROBE: CPT | Performed by: EMERGENCY MEDICINE

## 2021-10-08 PROCEDURE — 258N000003 HC RX IP 258 OP 636: Performed by: INTERNAL MEDICINE

## 2021-10-08 PROCEDURE — 120N000002 HC R&B MED SURG/OB UMMC

## 2021-10-08 PROCEDURE — 250N000011 HC RX IP 250 OP 636: Mod: JZ | Performed by: INTERNAL MEDICINE

## 2021-10-08 PROCEDURE — 74455 X-RAY URETHRA/BLADDER: CPT

## 2021-10-08 PROCEDURE — 272N000001 HC OR GENERAL SUPPLY STERILE: Performed by: UROLOGY

## 2021-10-08 PROCEDURE — 258N000003 HC RX IP 258 OP 636: Performed by: PEDIATRICS

## 2021-10-08 PROCEDURE — 80048 BASIC METABOLIC PNL TOTAL CA: CPT | Performed by: PEDIATRICS

## 2021-10-08 PROCEDURE — 96367 TX/PROPH/DG ADDL SEQ IV INF: CPT | Performed by: EMERGENCY MEDICINE

## 2021-10-08 PROCEDURE — 74450 X-RAY URETHRA/BLADDER: CPT | Mod: 26 | Performed by: STUDENT IN AN ORGANIZED HEALTH CARE EDUCATION/TRAINING PROGRAM

## 2021-10-08 PROCEDURE — 96361 HYDRATE IV INFUSION ADD-ON: CPT | Performed by: EMERGENCY MEDICINE

## 2021-10-08 PROCEDURE — 74177 CT ABD & PELVIS W/CONTRAST: CPT

## 2021-10-08 PROCEDURE — 86706 HEP B SURFACE ANTIBODY: CPT | Performed by: INTERNAL MEDICINE

## 2021-10-08 PROCEDURE — 36415 COLL VENOUS BLD VENIPUNCTURE: CPT | Performed by: INTERNAL MEDICINE

## 2021-10-08 RX ORDER — GADOBUTROL 604.72 MG/ML
10 INJECTION INTRAVENOUS ONCE
Status: COMPLETED | OUTPATIENT
Start: 2021-10-08 | End: 2021-10-08

## 2021-10-08 RX ORDER — NALOXONE HYDROCHLORIDE 0.4 MG/ML
0.2 INJECTION, SOLUTION INTRAMUSCULAR; INTRAVENOUS; SUBCUTANEOUS
Status: DISCONTINUED | OUTPATIENT
Start: 2021-10-08 | End: 2021-10-11 | Stop reason: HOSPADM

## 2021-10-08 RX ORDER — SODIUM CHLORIDE, SODIUM LACTATE, POTASSIUM CHLORIDE, CALCIUM CHLORIDE 600; 310; 30; 20 MG/100ML; MG/100ML; MG/100ML; MG/100ML
INJECTION, SOLUTION INTRAVENOUS CONTINUOUS
Status: DISCONTINUED | OUTPATIENT
Start: 2021-10-08 | End: 2021-10-10

## 2021-10-08 RX ORDER — PIPERACILLIN SODIUM, TAZOBACTAM SODIUM 3; .375 G/15ML; G/15ML
3.38 INJECTION, POWDER, LYOPHILIZED, FOR SOLUTION INTRAVENOUS EVERY 6 HOURS
Status: DISCONTINUED | OUTPATIENT
Start: 2021-10-08 | End: 2021-10-11

## 2021-10-08 RX ORDER — ACETAMINOPHEN 325 MG/1
650 TABLET ORAL EVERY 6 HOURS PRN
Status: DISCONTINUED | OUTPATIENT
Start: 2021-10-08 | End: 2021-10-11 | Stop reason: HOSPADM

## 2021-10-08 RX ORDER — LEVOFLOXACIN 5 MG/ML
500 INJECTION, SOLUTION INTRAVENOUS EVERY 24 HOURS
Status: DISCONTINUED | OUTPATIENT
Start: 2021-10-08 | End: 2021-10-08

## 2021-10-08 RX ORDER — IOPAMIDOL 612 MG/ML
50 INJECTION, SOLUTION INTRAVASCULAR ONCE
Status: COMPLETED | OUTPATIENT
Start: 2021-10-08 | End: 2021-10-08

## 2021-10-08 RX ORDER — NALOXONE HYDROCHLORIDE 0.4 MG/ML
0.4 INJECTION, SOLUTION INTRAMUSCULAR; INTRAVENOUS; SUBCUTANEOUS
Status: DISCONTINUED | OUTPATIENT
Start: 2021-10-08 | End: 2021-10-11 | Stop reason: HOSPADM

## 2021-10-08 RX ORDER — SODIUM CHLORIDE 9 MG/ML
INJECTION, SOLUTION INTRAVENOUS CONTINUOUS
Status: DISCONTINUED | OUTPATIENT
Start: 2021-10-08 | End: 2021-10-08

## 2021-10-08 RX ORDER — ONDANSETRON 2 MG/ML
4 INJECTION INTRAMUSCULAR; INTRAVENOUS EVERY 6 HOURS PRN
Status: DISCONTINUED | OUTPATIENT
Start: 2021-10-08 | End: 2021-10-11 | Stop reason: HOSPADM

## 2021-10-08 RX ORDER — LEVOFLOXACIN 500 MG/1
500 TABLET, FILM COATED ORAL DAILY
Status: DISCONTINUED | OUTPATIENT
Start: 2021-10-08 | End: 2021-10-08

## 2021-10-08 RX ORDER — CEFTRIAXONE 1 G/1
1 INJECTION, POWDER, FOR SOLUTION INTRAMUSCULAR; INTRAVENOUS ONCE
Status: COMPLETED | OUTPATIENT
Start: 2021-10-08 | End: 2021-10-08

## 2021-10-08 RX ORDER — AMOXICILLIN 250 MG
1 CAPSULE ORAL 2 TIMES DAILY PRN
Status: DISCONTINUED | OUTPATIENT
Start: 2021-10-08 | End: 2021-10-11 | Stop reason: HOSPADM

## 2021-10-08 RX ORDER — IOPAMIDOL 755 MG/ML
50 INJECTION, SOLUTION INTRAVASCULAR ONCE
Status: COMPLETED | OUTPATIENT
Start: 2021-10-08 | End: 2021-10-08

## 2021-10-08 RX ORDER — LIDOCAINE 40 MG/G
CREAM TOPICAL
Status: DISCONTINUED | OUTPATIENT
Start: 2021-10-08 | End: 2021-10-11 | Stop reason: HOSPADM

## 2021-10-08 RX ORDER — AMOXICILLIN 250 MG
2 CAPSULE ORAL 2 TIMES DAILY PRN
Status: DISCONTINUED | OUTPATIENT
Start: 2021-10-08 | End: 2021-10-11 | Stop reason: HOSPADM

## 2021-10-08 RX ORDER — BICALUTAMIDE 50 MG/1
50 TABLET, FILM COATED ORAL DAILY
Status: ON HOLD | COMMUNITY
End: 2021-10-08

## 2021-10-08 RX ORDER — IOPAMIDOL 755 MG/ML
100 INJECTION, SOLUTION INTRAVASCULAR ONCE
Status: COMPLETED | OUTPATIENT
Start: 2021-10-08 | End: 2021-10-08

## 2021-10-08 RX ORDER — ONDANSETRON 4 MG/1
4 TABLET, ORALLY DISINTEGRATING ORAL EVERY 6 HOURS PRN
Status: DISCONTINUED | OUTPATIENT
Start: 2021-10-08 | End: 2021-10-11 | Stop reason: HOSPADM

## 2021-10-08 RX ORDER — OXYCODONE HYDROCHLORIDE 5 MG/1
5 TABLET ORAL EVERY 4 HOURS PRN
Status: DISCONTINUED | OUTPATIENT
Start: 2021-10-08 | End: 2021-10-11 | Stop reason: HOSPADM

## 2021-10-08 RX ADMIN — SODIUM CHLORIDE 68 ML: 9 INJECTION, SOLUTION INTRAVENOUS at 00:36

## 2021-10-08 RX ADMIN — CEFTRIAXONE SODIUM 1 G: 1 INJECTION, POWDER, FOR SOLUTION INTRAMUSCULAR; INTRAVENOUS at 02:05

## 2021-10-08 RX ADMIN — GENTAMICIN SULFATE 640 MG: 40 INJECTION, SOLUTION INTRAMUSCULAR; INTRAVENOUS at 03:26

## 2021-10-08 RX ADMIN — SODIUM CHLORIDE: 9 INJECTION, SOLUTION INTRAVENOUS at 05:45

## 2021-10-08 RX ADMIN — PIPERACILLIN AND TAZOBACTAM 3.38 G: 3; .375 INJECTION, POWDER, LYOPHILIZED, FOR SOLUTION INTRAVENOUS at 23:31

## 2021-10-08 RX ADMIN — IOPAMIDOL 100 ML: 755 INJECTION, SOLUTION INTRAVENOUS at 00:30

## 2021-10-08 RX ADMIN — GADOBUTROL 10 ML: 604.72 INJECTION INTRAVENOUS at 03:05

## 2021-10-08 RX ADMIN — SODIUM CHLORIDE, POTASSIUM CHLORIDE, SODIUM LACTATE AND CALCIUM CHLORIDE: 600; 310; 30; 20 INJECTION, SOLUTION INTRAVENOUS at 10:22

## 2021-10-08 RX ADMIN — IOPAMIDOL 15 ML: 612 INJECTION, SOLUTION INTRAVENOUS at 16:33

## 2021-10-08 RX ADMIN — SODIUM CHLORIDE: 9 INJECTION, SOLUTION INTRAVENOUS at 03:31

## 2021-10-08 RX ADMIN — PIPERACILLIN AND TAZOBACTAM 3.38 G: 3; .375 INJECTION, POWDER, LYOPHILIZED, FOR SOLUTION INTRAVENOUS at 10:22

## 2021-10-08 RX ADMIN — IOPAMIDOL 12 ML: 755 INJECTION, SOLUTION INTRAVENOUS at 00:35

## 2021-10-08 RX ADMIN — SODIUM CHLORIDE 1000 ML: 9 INJECTION, SOLUTION INTRAVENOUS at 01:22

## 2021-10-08 RX ADMIN — PIPERACILLIN AND TAZOBACTAM 3.38 G: 3; .375 INJECTION, POWDER, LYOPHILIZED, FOR SOLUTION INTRAVENOUS at 15:41

## 2021-10-08 NOTE — CONSULTS
IR consulted on urology recommendation regarding abscess adjacent to prostate which looks like it involves the urethra. Urology recommended broad spectrum antibiotics which IR agrees with.    IR is deferring aspiration/drain placement attempt as it is small and involves urethra, would likely require transrectal approach. Recommend antibiotics and follow clinically. If decompensates, get a new CT or MR and reconsult IR for drainage.    Ke Bryant PA-C  Interventional Radiology  [phone]

## 2021-10-08 NOTE — PHARMACY-ADMISSION MEDICATION HISTORY
Admission Medication History Completed by Pharmacy    See [facility] Admission Navigator for allergy information, preferred outpatient pharmacy, prior to admission medications and immunization status.     Medication History Sources:     Patient - Nagi    Jackie      Changes made to PTA medication list (reason):    Added: None    Deleted: None    Changed: None    Additional Information:    Patient's last doses of Clindamycin 150mg - 3 tablet three times a day and Bactrim -160mg - 1 tablet 2 times a day were yesterday (per prescriptions) but patient still has couple pills left at home.    Prior to Admission medications    Medication Sig Last Dose Taking? Auth Provider   ibuprofen (ADVIL/MOTRIN) 200 MG tablet Take 600 mg by mouth every 4 hours as needed for mild pain 10/7/2021 at 1600 Yes Reported, Patient       Date completed: 10/08/21    Medication history completed by: Ju Rodriguez

## 2021-10-08 NOTE — CONSULTS
Urology Consult    Name: Kwasi Castellanos    MRN: [id number]   YOB: 1990               Chief Complaint:   Prostatic abscess    History is obtained from the patient and chart review          History of Present Illness:   Kwasi Castellanos is a 31 year old male w/ PMH of recent dog bite to the the posterior R thigh and right forearmfor which he is still on clindamycin and bactrim, currently admitted for prostatitis/prostatic abscess for which urology is consulted.    Mr. Castellanos was in his normal state of health until 2 weeks ago he suffered a dog bit to the R forearm and R thigh during a trip [place]. He did not seek medical care at the time. Since then he has developed perineal pain where it hurts to sit and bend over. 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 He developed some low grade fevers and saw a doctor who started him on clindamycin and bactrim for likely wound infection from his dog bites. While on those medications over the past 1-2 days he developed fevers, N/V, rigors, and presented to the ED. Since being in the ED he has undergone a CT abd/pelvis which showed a fluid collection at the base of the penis. MRI characterized this fluid collection as a non-specific peripheral enhancing fluid collection that most likely represents an abscess.    Otherwise he denies SOB, CP, abdominal pain, dysuria, frequency, urgency, hematuria. He is sexually active with his GF of 4 years who he is monogamous with. He has no concern for STIs.            Past Medical History:   History reviewed. No pertinent past medical history.         Past Surgical History:   History reviewed. No pertinent surgical history.         Social History:     Social History     Tobacco Use     Smoking status: Never Smoker     Smokeless tobacco: Never Used   Substance Use Topics     Alcohol use: Not Currently            Family History:   History reviewed. No pertinent family history.         Allergies:      Allergies   Allergen Reactions     Penicillins Hives            Medications:     Current Facility-Administered Medications   Medication     acetaminophen (TYLENOL) tablet 650 mg     [START ON 10/9/2021] gentamicin (GARAMYCIN) 140 mg in sodium chloride 0.9 % 50 mL intermittent infusion     levofloxacin (LEVAQUIN) tablet 500 mg     lidocaine (LMX4) cream     lidocaine 1 % 0.1-1 mL     melatonin tablet 1 mg     naloxone (NARCAN) injection 0.2 mg    Or     naloxone (NARCAN) injection 0.4 mg    Or     naloxone (NARCAN) injection 0.2 mg    Or     naloxone (NARCAN) injection 0.4 mg     ondansetron (ZOFRAN-ODT) ODT tab 4 mg    Or     ondansetron (ZOFRAN) injection 4 mg     oxyCODONE (ROXICODONE) tablet 5 mg     senna-docusate (SENOKOT-S/PERICOLACE) 8.6-50 MG per tablet 1 tablet    Or     senna-docusate (SENOKOT-S/PERICOLACE) 8.6-50 MG per tablet 2 tablet     sodium chloride (PF) 0.9% PF flush 3 mL     sodium chloride (PF) 0.9% PF flush 3 mL     sodium chloride 0.9% infusion     sodium chloride 0.9% infusion     sodium chloride 0.9% infusion             Review of Systems:    ROS: 10 point ROS neg other than the symptoms noted above in the HPI           Physical Exam:   VS:  T: 99.7    HR: 92    BP: 104/51    RR: 19   GEN:  AOx3.  NAD.    CV:  RRR  LUNGS: Non-labored breathing.   BACK:  No midline or CVA tenderness.  ABD:  Soft.  NT.  ND.  No rebound or guarding.  No masses.  : circumcised.  Normal penile shaft.  Testicles descended bilaterally, no nodules or tenderness.  No inguinal hernias. Perineum non-tender, no fluctuance, swelling or erythema noted.   CHAVA:  Prostate smooth, symmetric, no nodules.  EXT:  1-2cm R forearm wound with yellow eschar, mild regino-wound erythema, difficult to assess depth of injury given eschar. Mostly healed R thigh superficial wound.             Data:   All laboratory data reviewed:    Recent Labs   Lab 10/07/21  2118 10/03/21  1219   WBC 6.6 8.3   HGB 14.0 13.5    229      Recent Labs   Lab 10/08/21  0625 10/07/21  2118    138   POTASSIUM 4.3 3.6   CHLORIDE 108 106   CO2 25 26   BUN 16 18   CR 1.54* 1.75*   * 103*   JOSE E 8.0* 9.0     Recent Labs   Lab 10/08/21  0541 10/03/21  1212 10/03/21  1212   COLOR Light Yellow   < > Yellow   APPEARANCE Clear   < > Clear   URINEGLC Negative   < > Negative   URINEBILI Negative   < > Negative   URINEKETONE Negative   < > Negative   SG 1.010   < > >=1.030   URINEPH 7.5*   < > 6.0   PROTEIN 20 *   < > Negative   UROBILINOGEN  --   --  0.2   NITRITE Negative   < > Negative   LEUKEST Negative   < > Negative   RBCU 1  --   --    WBCU 1  --   --     < > = values in this interval not displayed.       All pertinent imaging reviewed:    CT scan of the abdomen:      IMPRESSION:   1.  Findings suspicious for prostatitis/prostatic abscess. Consider additional characterization with pelvic MRI.    MRI Pelvis  IMPRESSION: A 2 cm irregular-shaped peripheral enhancing fluid collection centered within the region of the penile base and abutting the inferior aspect of the prostate gland. This is nonspecific, but most likely represents an abscess.               Impression and Plan:   Impression:   Kwasi Castellanos is a 31 year old male w/ PMH of recent dog bite to the the posterior R thigh and right forearmfor which he is still on clindamycin and bactrim, currently admitted for signs of systemic infection.    Mr. Castellanos does not have clinical findings consistent with prostatitis. His  exam is totally normal w/o evidence of fluctuance, erythema, or tenderness. His prostate is not boggy and is non-tender. Additionally, it should be emphasized the fluid collection is outside the prostate, and is mostly collected at the base of the penis.    At this time I am unsure of the etiology of this fluid collection or if it is related to his systemic signs of infection. He does have a 2 week history of perineal pain, but this is not reproducible on exam, and  could be related to rugby msk trauma/overuse.     At this time would recommend a RUG in order to better evaluate his urethral anatomy. This fluid collection could be the result of a urethral fistula or diverticula (although very unlikely given his negative UA). If the RUG is normal, would recommend fluid aspiration by IR to better characterize the contents of this fluid collection which ultimately could be an incidental finding.       Plan:  - would recommend treating with broad spectrum abx, choice per primary team, as source of infection remains unclear  - RUG stat ordered for you  - if RUG is normal, will recommend IR aspiration w/ cultures and gram stain, primary team to discuss plan with IR  - keep NPO for possible procedure today  - urology will continue to follow    This patient's exam findings, labs, and imaging discussed with urology staff surgeon Dr. Bishop Lang MD, who developed the treatment plan.    Srinath Batista  PGY-2  [phone]

## 2021-10-08 NOTE — ED NOTES
Emergency Department Patient Sign-out       Brief HPI:  This is a 31 year old male signed out to me by Dr. Rapp .  See initial ED Provider note for details of the presentation.            Significant Events prior to my assuming care: Patient with fever, chills, groin pain. Bit by dog couple weeks ago R hamstring and L forearm. Placed on abx after this. Patient currently finishing clinda/bactrim. Febrile and hypotensive. Lactic 2.2. BP improved with fluids. ? Prostatitis. CT pending. XR chest normal. UA pending. Skin looks ok.      Exam:   Patient Vitals for the past 24 hrs:   BP Temp Temp src Pulse Resp SpO2 Weight   10/08/21 0045 105/62 -- -- 90 -- 98 % --   10/08/21 0044 105/62 98.9  F (37.2  C) Oral 88 -- 97 % --   10/07/21 2315 95/72 -- -- -- -- 99 % --   10/07/21 2115 (!) 86/59 -- -- -- 20 99 % --   10/07/21 2050 (!) 85/49 (!) 101.7  F (38.7  C) Oral 117 16 97 % 104.3 kg (230 lb)           ED RESULTS:   Results for orders placed or performed during the hospital encounter of 10/07/21 (from the past 24 hour(s))   [place] Draw     Status: None    Collection Time: 10/07/21  9:18 PM    Narrative    The following orders were created for panel order [place] Draw.  Procedure                               Abnormality         Status                     ---------                               -----------         ------                     Extra Blood Culture Bottle[[id number]]                       Final result               Extra Blue Top Tube[[id number]]                              Final result               Extra Red Top Tube[[id number]]                               Final result               Extra Green Top (Lithium...[[id number]]                      Final result               Extra Purple Top Tube[[id number]]                            Final result                 Please view results for these tests on the individual orders.   [place]vin Draw     Status: None    Collection Time: 10/07/21  9:18 PM    Narrative     The following orders were created for panel order [place] Draw.  Procedure                               Abnormality         Status                     ---------                               -----------         ------                     Extra Blood Culture Bottle[[id number]]                       Final result                 Please view results for these tests on the individual orders.   Extra Blood Culture Bottle     Status: None    Collection Time: 10/07/21  9:18 PM   Result Value Ref Range    Hold Specimen JIC    Extra Blue Top Tube     Status: None    Collection Time: 10/07/21  9:18 PM   Result Value Ref Range    Hold Specimen JIC    Extra Red Top Tube     Status: None    Collection Time: 10/07/21  9:18 PM   Result Value Ref Range    Hold Specimen JIC    Extra Green Top (Lithium Heparin) Tube     Status: None    Collection Time: 10/07/21  9:18 PM   Result Value Ref Range    Hold Specimen JIC    Extra Purple Top Tube     Status: None    Collection Time: 10/07/21  9:18 PM   Result Value Ref Range    Hold Specimen JIC    Extra Blood Culture Bottle     Status: None    Collection Time: 10/07/21  9:18 PM   Result Value Ref Range    Hold Specimen JIC    CBC with platelets differential     Status: Abnormal    Collection Time: 10/07/21  9:18 PM    Narrative    The following orders were created for panel order CBC with platelets differential.  Procedure                               Abnormality         Status                     ---------                               -----------         ------                     CBC with platelets and d...[[id number]]  Abnormal            Final result               RBC and Platelet Morphology[[id number]]                      Final result                 Please view results for these tests on the individual orders.   Comprehensive metabolic panel     Status: Abnormal    Collection Time: 10/07/21  9:18 PM   Result Value Ref Range    Sodium 138 133 - 144 mmol/L    Potassium 3.6 3.4 - 5.3  mmol/L    Chloride 106 94 - 109 mmol/L    Carbon Dioxide (CO2) 26 20 - 32 mmol/L    Anion Gap 6 3 - 14 mmol/L    Urea Nitrogen 18 7 - 30 mg/dL    Creatinine 1.75 (H) 0.66 - 1.25 mg/dL    Calcium 9.0 8.5 - 10.1 mg/dL    Glucose 103 (H) 70 - 99 mg/dL    Alkaline Phosphatase 92 40 - 150 U/L    AST 28 0 - 45 U/L    ALT 37 0 - 70 U/L    Protein Total 7.6 6.8 - 8.8 g/dL    Albumin 3.7 3.4 - 5.0 g/dL    Bilirubin Total 0.7 0.2 - 1.3 mg/dL    GFR Estimate 51 (L) >60 mL/min/1.73m2   CRP inflammation     Status: Abnormal    Collection Time: 10/07/21  9:18 PM   Result Value Ref Range    CRP Inflammation 50.0 (H) 0.0 - 8.0 mg/L   Erythrocyte sedimentation rate auto     Status: Abnormal    Collection Time: 10/07/21  9:18 PM   Result Value Ref Range    Erythrocyte Sedimentation Rate 17 (H) 0 - 15 mm/hr   CBC with platelets and differential     Status: Abnormal    Collection Time: 10/07/21  9:18 PM   Result Value Ref Range    WBC Count 6.6 4.0 - 11.0 10e3/uL    RBC Count 4.73 4.40 - 5.90 10e6/uL    Hemoglobin 14.0 13.3 - 17.7 g/dL    Hematocrit 41.2 40.0 - 53.0 %    MCV 87 78 - 100 fL    MCH 29.6 26.5 - 33.0 pg    MCHC 34.0 31.5 - 36.5 g/dL    RDW 12.1 10.0 - 15.0 %    Platelet Count 275 150 - 450 10e3/uL    % Neutrophils 93 %    % Lymphocytes 5 %    % Monocytes 1 %    % Eosinophils 0 %    % Basophils 0 %    % Immature Granulocytes 1 %    NRBCs per 100 WBC 0 <1 /100    Absolute Neutrophils 6.1 1.6 - 8.3 10e3/uL    Absolute Lymphocytes 0.3 (L) 0.8 - 5.3 10e3/uL    Absolute Monocytes 0.0 0.0 - 1.3 10e3/uL    Absolute Eosinophils 0.0 0.0 - 0.7 10e3/uL    Absolute Basophils 0.0 0.0 - 0.2 10e3/uL    Absolute Immature Granulocytes 0.1 (H) <=0.0 10e3/uL    Absolute NRBCs 0.0 10e3/uL   RBC and Platelet Morphology     Status: None    Collection Time: 10/07/21  9:18 PM   Result Value Ref Range    Platelet Assessment  Automated Count Confirmed. Platelet morphology is normal.     Automated Count Confirmed. Platelet morphology is normal.     RBC Morphology Confirmed RBC Indices    Asymptomatic COVID-19 Virus (Coronavirus) by PCR Oropharynx     Status: Normal    Collection Time: 10/07/21 11:08 PM    Specimen: Oropharynx; Swab   Result Value Ref Range    SARS CoV2 PCR Negative Negative    Narrative    Testing was performed using the laurent  SARS-CoV-2 & Influenza A/B Assay on the laurent  [employer]  System.  This test should be ordered for the detection of SARS-COV-2 in individuals who meet SARS-CoV-2 clinical and/or epidemiological criteria. Test performance is unknown in asymptomatic patients.  This test is for in vitro diagnostic use under the FDA EUA for laboratories certified under CLIA to perform moderate and/or high complexity testing. This test has not been FDA cleared or approved.  A negative test does not rule out the presence of PCR inhibitors in the specimen or target RNA in concentration below the limit of detection for the assay. The possibility of a false negative should be considered if the patient's recent exposure or clinical presentation suggests COVID-19.  [facility] are certified under the Clinical Laboratory Improvement Amendments of 1988 (CLIA-88) as qualified to perform moderate and/or high complexity laboratory testing.   XR Chest 2 Views     Status: None    Collection Time: 10/08/21 12:13 AM    Narrative    EXAM: XR CHEST 2 VW  LOCATION: [facility]  DATE/TIME: 10/7/2021 11:50 PM    INDICATION: Fever  COMPARISON: None.      Impression    IMPRESSION: Negative chest.       ED MEDICATIONS:   Medications   0.9% sodium chloride BOLUS (1,000 mLs Intravenous New Bag 10/7/21 1616)     Followed by   sodium chloride 0.9% infusion (has no administration in time range)   0.9% sodium chloride BOLUS (has no administration in time range)     Followed by   sodium chloride 0.9% infusion (has no administration in time range)   iopamidol (ISOVUE-370) solution 100 mL (100 mLs Intravenous Given  10/8/21 0030)   iopamidol (ISOVUE-370) solution 50 mL (12 mLs Intravenous Given 10/8/21 0035)   sodium chloride 0.9 % bag 100mL (68 mLs Intravenous Given 10/8/21 0036)         Impression:  No diagnosis found.    Plan:    Patient with septic prostatitis, MR pending, abx ordered. Will be admitted to medicine on [facility].        MD Shreyas Samaniego, Alphonse June MD  10/08/21 0116

## 2021-10-08 NOTE — CONSULTS
[facility] ID SERVICE - CONSULT NOTE  Patient:  Kwasi Castellanos, Date of birth 1990, Medical record number [id number]  Date of Admission: 10/7/2021  Date of Visit:  10/8/2021  Requesting Provider: Layo Chen         Assessment and Recommendations:   Problem List:    # Prostatic abscess    Discussion:    Mr. Kwasi Castellanos is a 31 year old male with no significant medical history except for recent dog bite who presented with chills and perineal pain and found to have a prostatic abscess. Admitted on 10/7/21. Patient noted that he had a dog bite on 9/24 to his right forearm and to his posterior thigh and has been taking his antibiotics (Clinda, Bactrim).  He reports good compliance and that these are done today.  The wound to his posterior thigh and his right arm have resolved.  He notes there is no pain and his forearm seems to be doing well. He states that the dog was domesticated and vaccinated. The dog belongs to a fried of him and the dog was observed and is doing well. Therefore he did not receive rabies posit exposure prophylaxis. He did not receive tetanus shot and his past Tdap was in 2004 per records. More recently, he  noted that he has had pain to the area between his testicles and his rectum.  For the last few days prior to the current admisson he has had burning with urination.  There is no burning other times.  No discharge.  Reports monogamy with girlfriend of 4 years.  Does say that a pilonidal cyst for years that occasionally causes him pain but has never had an infection or drainage there.  He was noted to be febrile in the ED to 101.7 and per the ED physician looked quite ill on presentation prior to resuscitation.  White count was normal, CRP was elevated at 50. Lactic acid was normal.  Some lightheadedness and shortness of breath.  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  No obvious abdominal trauma that he can recall.  Received 2L IVF and abx in the ED.  "Creatinine was 1.75 on admission and improved to 1.54 after fluids. Blood cultures were obtained and urology was consulted. Patient was started on ceftriaxone and gentamicin on 10/8 per urology recommendation given suspicion of prostatitis. CT abdomen/pelvis showed: \" Findings suspicious for prostatitis/prostatic abscess.\". MRI pelvis was performed and showed: \"A 2 cm irregular-shaped peripheral enhancing fluid collection centered within the region of the penile base and abutting the inferior aspect of the prostate gland. This is nonspecific, but most likely represents an abscess\". Urology recommended to consult IR for drainage. IR reviewed the images and recommends deferring aspiration/drain placement given small size of the colection and involvement of the urethra. According to IR it would likely require transrectal approach. They recommended antibiotics and follow clinically. If decompensates, get a new CT or MR and reconsult IR for drainage. Gonorrheae and Chlamydia PCR from urine obtained and are negative     Urology is planning to perform cystoscopy to rule out abnormalities in the lower urinary tract. Today antibiotics were changed from ceftriaxone and gentamicin to zosyn. Patient had an unclear report of potential reaction to penicillins, however he tolerated the Zosyn well and did not have any reaction.     Recommendations:    1. Since the patient is tolerating zosyn and did not have any reaction, I recommend to continue zosyn     - If any rash or sign of delayed reaction it can be switched to ceftriaxone and metronidazole    2. Appreciate urology support    3. Will continue to follow blood cultures    4. For completeness, I will obtain HIV, Hep B/C serology and syphilis test (however low risk)    5. Please provide tetanus shot (Tdap) since the patient had a dog bite 2 weeks ago and lats tetanus shot was in 2004        Recommendations discussed with medicine team       Thank you for this consult. The " General ID team will continue to follow this patient. Please feel free to call with any questions. Dr. Lagos will be covering the ID service over the weekend and Dr. Gaitan with be covering the ID service next week starting on 10/11/21.     Mary De La Cruz MD  Date of Service: 10/08/21  Pager: [phone]       History of Present Illness:   Mr. Kwasi Castellanos is a 31 year old male with no significant medical history except for recent dog bite who presented with chills and perineal pain and found to have a prostatic abscess. Admitted on 10/7/21.     Patient noted that he had a dog bite on 9/24 to his right forearm and to his posterior thigh and has been taking his antibiotics (Clinda, Bactrim).  He reports good compliance and that these are done today.  The wound to his posterior thigh and his right arm have resolved.  He notes there is no pain and his forearm seems to be doing well. He states that the dog was domesticated and vaccinated. The dog belongs to a fried of him and the dog was observed and is doing well. Therefore he did not receive rabies posit exposure prophylaxis. He did not receive tetanus shot and his past Tdap was in 2004 per records. More recently, he  noted that he has had pain to the area between his testicles and his rectum.  For the last few days prior to the current admisson he has had burning with urination.  There is no burning other times.  No discharge.  Reports monogamy with girlfriend of 4 years.  Does say that a pilonidal cyst for years that occasionally causes him pain but has never had an infection or drainage there.  He was noted to be febrile in the ED to 101.7 and per the ED physician looked quite ill on presentation prior to resuscitation.  White count was normal, CRP was elevated at 50. Lactic acid was normal.  Some lightheadedness and shortness of breath.  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  No obvious abdominal trauma that he can recall.  Received 2L IVF  "and abx in the ED. Creatinine was 1.75 on admission and improved to 1.54 after fluids. Blood cultures were obtained and urology was consulted. Patient was started on ceftriaxone and gentamicin on 10/8 per urology recommendation given suspicion of prostatitis. CT abdomen/pelvis showed: \" Findings suspicious for prostatitis/prostatic abscess.\". MRI pelvis was performed and showed: \"A 2 cm irregular-shaped peripheral enhancing fluid collection centered within the region of the penile base and abutting the inferior aspect of the prostate gland. This is nonspecific, but most likely represents an abscess\". Urology recommended to consult IR for drainage. IR reviewed the images and recommends deferring aspiration/drain placement given small size of the colection and involvement of the urethra. According to IR it would likely require transrectal approach. They recommended antibiotics and follow clinically. If decompensates, get a new CT or MR and reconsult IR for drainage. Gonorrheae and Chlamydia PCR from urine obtained and in process.     Urology is planning to perform cystoscopy to rule out abnormalities in the lower urinary tract. Today antibiotics were changed from ceftriaxone and gentamicin to zosyn. Patient had an unclear report of potential reaction to penicillins, however he tolerated the Zosyn well and did not have any reaction.             Review of Systems:     CONSTITUTIONAL:  See HPI  INTEGUMENTARY/SKIN: NEGATIVE for worrisome rashes, moles or lesions  EYES: Negative for icterus, vision changes or irritation  ENT/MOUTH:  Negative for oral lesions and sore throat  RESPIRATORY:  Negative for cough and dyspnea  CARDIOVASCULAR:  Negative for chest pain, palpitations and  shortness of breath  GASTROINTESTINAL:  Negative for abdominal pain, nausea, vomiting, diarrhea and constipation  GENITOURINARY: See HPI  MUSCULOSKELETAL: Negative for joint pain, swelling, motion restriction, negative for musculoskeletal " pain  NEURO:  Negative for headache, altered mental status, numbness or weakness  PSYCHIATRIC: Negative for changes in mood or affect  HEMATOLOGIC/LYMPHATIC: negative for lymphadenopathy or bleeding  ALLERGIC/IMMUNOLOGIC: Negative for allergic reaction   ENDOCRINE: Negative for temperature intolerance, skin/hair changes       Past Medical History:   See HPI      Allergies:      Allergies   Allergen Reactions     Penicillins Hives            Family History:     History reviewed. No pertinent family history.         Social History:     Social History     Socioeconomic History     Marital status: Single     Spouse name: Not on file     Number of children: Not on file     Years of education: Not on file     Highest education level: Not on file   Occupational History     Not on file   Tobacco Use     Smoking status: Never Smoker     Smokeless tobacco: Never Used   Substance and Sexual Activity     Alcohol use: Not Currently     Drug use: Never     Sexual activity: Not on file   Other Topics Concern     Not on file   Social History Narrative     Not on file     Social Determinants of Health     Financial Resource Strain:      Difficulty of Paying Living Expenses:    Food Insecurity:      Worried About Running Out of Food in the Last Year:      Ran Out of Food in the Last Year:    Transportation Needs:      Lack of Transportation (Medical):      Lack of Transportation (Non-Medical):    Physical Activity:      Days of Exercise per Week:      Minutes of Exercise per Session:    Stress:      Feeling of Stress :    Social Connections:      Frequency of Communication with Friends and Family:      Frequency of Social Gatherings with Friends and Family:      Attends Buddhism Services:      Active Member of Clubs or Organizations:      Attends Club or Organization Meetings:      Marital Status:    Intimate Partner Violence:      Fear of Current or Ex-Partner:      Emotionally Abused:      Physically Abused:      Sexually Abused:                Physical Exam:   BP 99/47   Pulse 74   Temp 99.7  F (37.6  C) (Oral)   Resp 16   Wt 104.3 kg (230 lb)   SpO2 99%   BMI 29.53 kg/m         Exam:  GENERAL:  Well-developed, well-nourished, not in acute distress.   HEAD: Normocephalic and atraumatic  ENT:  No hearing impairment, oral mucous membranes moist  EYES:  Eyes grossly normal to inspection, PERRL and conjunctivae and sclerae normal   NECK:  Supple  LUNGS:  Clear to auscultation - no rales, rhonchi or wheezes  CARDIOVASCULAR:  Regular rate and rhythm, normal S1 S2  ABDOMEN:  Soft, nontender, no hepatosplenomegaly, no masses and bowel sounds normal  EXT: Extremities warm and without edema.  MS: No gross musculoskeletal defects noted, no edema  NEUROLOGIC:  Grossly nonfocal. Normal strength and tone, mentation intact and speech normal  PSYCHIATRIC: Mood stable, mentation appears normal, affect normal           Laboratory Data:     Creatinine   Date Value Ref Range Status   10/08/2021 1.54 (H) 0.66 - 1.25 mg/dL Final   10/07/2021 1.75 (H) 0.66 - 1.25 mg/dL Final     WBC Count   Date Value Ref Range Status   10/07/2021 6.6 4.0 - 11.0 10e3/uL Final   10/03/2021 8.3 4.0 - 11.0 10e3/uL Final     Hemoglobin   Date Value Ref Range Status   10/07/2021 14.0 13.3 - 17.7 g/dL Final     Platelet Count   Date Value Ref Range Status   10/07/2021 275 150 - 450 10e3/uL Final     Lab Results   Component Value Date     10/08/2021    BUN 16 10/08/2021    CO2 25 10/08/2021     CRP Inflammation   Date Value Ref Range Status   10/07/2021 50.0 (H) 0.0 - 8.0 mg/L Final           Pertinent Recent Microbiology Data:   No results for input(s): CULT, SDES in the last 168 hours.         Imaging:     Recent Results (from the past 48 hour(s))   XR Chest 2 Views    Narrative    EXAM: XR CHEST 2 VW  LOCATION: [facility]  DATE/TIME: 10/7/2021 11:50 PM    INDICATION: Fever  COMPARISON: None.      Impression    IMPRESSION:  Negative chest.   CT Abdomen Pelvis w Contrast    Narrative    EXAM: CT ABDOMEN PELVIS W CONTRAST  LOCATION: [facility]  DATE/TIME: 10/8/2021 12:15 AM    INDICATION: Febrile, hypotensive, severe perineal pain, evaluate for abscess or necrotizing infection.  Please include upper thighs  COMPARISON: None.  TECHNIQUE: CT scan of the abdomen and pelvis was performed following injection of IV contrast. Multiplanar reformats were obtained. Dose reduction techniques were used.  CONTRAST: 112mL isovue-370    FINDINGS:   LOWER CHEST: Normal.    HEPATOBILIARY: Normal.    PANCREAS: Normal.    SPLEEN: Normal.    ADRENAL GLANDS: Normal.    KIDNEYS/BLADDER: No renal mass or hydronephrosis. No urinary tract obstruction or calcification. Urinary bladder within normal limits.    BOWEL: Normal appendix. No free air, free fluid, or inflammatory change. No obstruction.    LYMPH NODES: Normal.    VASCULATURE: Unremarkable.    PELVIC ORGANS: There is a vague ill-defined hypodense region at the base of the penis and extending slightly into the prostatic segment of the urethra, measuring approximately 0.8 x 1.6 x 2.5 cm as seen on axial for 77 and coronal 57. There is mild   adjacent fat stranding, left greater than right.    MUSCULOSKELETAL: Normal.      Impression    IMPRESSION:   1.  Findings suspicious for prostatitis/prostatic abscess. Consider additional characterization with pelvic MRI.   MR Pelvis (Intrapelvic Organs) wo&w Contrast    Narrative    EXAM: MR PELVIS WITHOUT AND WITH CONTRAST  LOCATION: [facility]  DATE/TIME: 10/8/2021 2:33 AM    INDICATION: Fever. Hypotension. Severe perineal pain.  COMPARISON: 10/08/2021 - CT abdomen and pelvis. The report from this study describes a vague ill-defined hypodense region at the base of the penis and extending slightly into the prostatic segment of the urethra.    TECHNIQUE: Routine MRI pelvis  without and with IV contrast. Axial, sagittal, and coronal high-resolution T2 and post gadolinium T1 with fat saturation.   CONTRAST: 10 mL Gadavist intravenous contrast.    FINDINGS: A small irregularly-shaped peripheral enhancing fluid collection is present in the perineum, centered in the region of the dorsal base of the penis and abutting the inferior aspect of the prostate gland (series 16 image 28). This measures up to   2.1 x 1.5 cm in axial dimensions. There is a finger-like projection of the fluid collection that extends approximately 3 cm anteriorly within the ventral aspect of the penile shaft (for example, series 16 image 32). Patchy enhancement of the soft   tissues surrounding this fluid collection. No other acute abnormality identified in the pelvis.      Impression    IMPRESSION: A 2 cm irregular-shaped peripheral enhancing fluid collection centered within the region of the penile base and abutting the inferior aspect of the prostate gland. This is nonspecific, but most likely represents an abscess.

## 2021-10-08 NOTE — H&P
[facility]    History and Physical - Hospitalist Service       Date of Admission:  10/7/2021    Assessment & Plan      Kwasi Castellanos is a 31 year old male admitted on 10/7/2021. He has a recent dog bite for which he is still on abx, which seems to be healing well, but presented with likely sepsis, and perineal pain.  Findings on CT are suggestive of prostatitis/ prostatic abscess (MRI is pending).   Reduced renal function is found, likely an JUAN F (no labs present)      Severe sepsis:   Probable prostatitis  Possible prostatic abscess  Presents with suspected constitutional sxs/sxs (chills, myalgias, shaking-- rigors) and peirneal pain.   Initial imaging shows prostatitis, possible abscess.  No leukocytosis, mildly elevated CRP.   Suspected JUAN F is suspected to be secondary to this problem.   -MRI pending  -UA, urine gram stain  -appreciate uro consult in AM  -received ceftri and gent -- will plan levo and gent with pharm dosing  -follow blood cultures  -continue to bolus to euvolemia      Probable JUAN F  DDx includes JUAN F from NSAIDS, JUAN F from sepsis, CKD/ other.  No e/o obstruction on CT  -continue fluid resusictation (has received 2L so far)  -avoid nephrotoxins  -treat sepsis  -recheck BMP in AM  -tylenol/ oxy for pain    Dog bite:  -healing well.  Has no completed abx.  -monitor       Diet:   NPO (until know if procedure will be occurring this AM)  DVT Prophylaxis: Pneumatic Compression Devices (pending possible procedrue)  Fitch Catheter: Not present  Central Lines: None  Code Status:   full    Clinically Significant Risk Factors Present on Admission                   Disposition Plan   Expected discharge:  2-3 days recommended to prior living arrangement once adequate pain management/ tolerating PO medications and antibiotic plan established.     The patient's care was discussed with the pt    Layo Hauser MD  [facility]  [facility]  Securely message with the [employer] Web Console (learn more here)  Text page via [facility] Paging/Directory      ______________________________________________________________________    Chief Complaint   Chills    History is obtained from the patient, ED physician, EMR    History of Present Illness   Kwasi Castellanos is a 31 year old male with no significant medical history except for recent dog bite who presented with chills and perineal pain.  Patient notes that he had a dog bite on 9/24 to his right forearm and to his posterior thigh and has been taking his antibiotics (Clinda, Bactrim).  He reports good compliance and that these are done today.  The wound to his posterior thigh has resolved.  He notes there is no pain and his forearm seems to be doing well 2.  However he notes that he has had pain to the area between his testicles and his rectum.  For the last few days he has had burning with urination.  There is no burning other times.  No discharge.  Reports monogamy with girlfriend of 4 years.  Does say that a pilonidal cyst for years that occasionally causes him pain but has never had an infection or drainage there.  He was noted to be febrile in the ED to 101.7 and per the ED physician looked quite ill on presentation prior to resuscitation.  Some lightheadedness and shortness of breath.  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  No obvious abdominal trauma that he can recall.  Received 2L IVF and abx in the ED,    Review of Systems    The 10 point Review of Systems is negative other than noted in the HPI or here.     Past Medical History    I have reviewed this patient's medical history and updated it with pertinent information if needed.   History reviewed. No pertinent past medical history.   Pilonidal cyst  No h/o STIs    Past Surgical History   I have reviewed this patient's surgical history and updated it with pertinent information if needed.  History reviewed. No  pertinent surgical history.    Social History   I have reviewed this patient's social history and updated it with pertinent information if needed.  Social History     Tobacco Use     Smoking status: Never Smoker     Smokeless tobacco: Never Used   Substance Use Topics     Alcohol use: Not Currently     Drug use: Never   Monogamous with GF of 4 years    Family History     No significant family history, including no history of:     Prior to Admission Medications   Prior to Admission Medications   Prescriptions Last Dose Informant Patient Reported? Taking?   clindamycin (CLEOCIN) 150 MG capsule 10/7/2021 at Unknown time  No Yes   Sig: Take 3 capsules (450 mg) by mouth 3 times daily for 7 days   ibuprofen (ADVIL/MOTRIN) 200 MG tablet 10/7/2021 at 1600  Yes Yes   Sig: Take 600 mg by mouth every 4 hours as needed for mild pain   sulfamethoxazole-trimethoprim (BACTRIM DS) 800-160 MG tablet 10/7/2021 at Unknown time  No Yes   Sig: Take 1 tablet by mouth 2 times daily for 7 days      Facility-Administered Medications: None     Allergies   Allergies   Allergen Reactions     Penicillins Hives       Physical Exam   Vital Signs: Temp: 98.9  F (37.2  C) Temp src: Oral BP: 96/44 Pulse: 90   Resp: 18 SpO2: 98 % O2 Device: None (Room air)    Weight: 230 lbs 0 oz    Gen: NAD, pleasant  Head: atraumatic  Eyes: white sclera, pupils equal  Throat: trachea midline  CV: RRR, no r/g/m  Pulm: nl WOB, CTAB  Abd: soft, NT  Skin: no rashes appreciated.   Volar forearm wounds is healing well, no discharge, no tendreness  : Tests and penis grossly nl.  No tenderness, redness, or swelling apprecaited  PV: no peripheral edema  Neuro: alert, interactive.  No gross abnormalities        Data   Data reviewed today: I reviewed all medications, new labs and imaging results over the last 24 hours. I personally reviewed     Recent Labs   Lab 10/07/21  2118 10/03/21  1219   WBC 6.6 8.3   HGB 14.0 13.5   MCV 87 88    229     --    POTASSIUM  3.6  --    CHLORIDE 106  --    CO2 26  --    BUN 18  --    CR 1.75*  --    ANIONGAP 6  --    JOSE E 9.0  --    *  --    ALBUMIN 3.7  --    PROTTOTAL 7.6  --    BILITOTAL 0.7  --    ALKPHOS 92  --    ALT 37  --    AST 28  --      Recent Results (from the past 24 hour(s))   XR Chest 2 Views    Narrative    EXAM: XR CHEST 2 VW  LOCATION: [facility]  DATE/TIME: 10/7/2021 11:50 PM    INDICATION: Fever  COMPARISON: None.      Impression    IMPRESSION: Negative chest.   CT Abdomen Pelvis w Contrast    Narrative    EXAM: CT ABDOMEN PELVIS W CONTRAST  LOCATION: [facility]  DATE/TIME: 10/8/2021 12:15 AM    INDICATION: Febrile, hypotensive, severe perineal pain, evaluate for abscess or necrotizing infection.  Please include upper thighs  COMPARISON: None.  TECHNIQUE: CT scan of the abdomen and pelvis was performed following injection of IV contrast. Multiplanar reformats were obtained. Dose reduction techniques were used.  CONTRAST: 112mL isovue-370    FINDINGS:   LOWER CHEST: Normal.    HEPATOBILIARY: Normal.    PANCREAS: Normal.    SPLEEN: Normal.    ADRENAL GLANDS: Normal.    KIDNEYS/BLADDER: No renal mass or hydronephrosis. No urinary tract obstruction or calcification. Urinary bladder within normal limits.    BOWEL: Normal appendix. No free air, free fluid, or inflammatory change. No obstruction.    LYMPH NODES: Normal.    VASCULATURE: Unremarkable.    PELVIC ORGANS: There is a vague ill-defined hypodense region at the base of the penis and extending slightly into the prostatic segment of the urethra, measuring approximately 0.8 x 1.6 x 2.5 cm as seen on axial for 77 and coronal 57. There is mild   adjacent fat stranding, left greater than right.    MUSCULOSKELETAL: Normal.      Impression    IMPRESSION:   1.  Findings suspicious for prostatitis/prostatic abscess. Consider additional characterization with pelvic MRI.

## 2021-10-08 NOTE — ED PROVIDER NOTES
[facility] [place] EMERGENCY DEPARTMENT ([facility])  10/07/21  History     Chief Complaint   Patient presents with     Chills     bitten by a dog a last friday, was seen a few days later and has been taking oral antibiotics, was feeling better and believes wounds are healing well; however, tonight he started having chills and shivers; also complains of pain to perineum where he has had a cyst     Groin Pain     HPI  Kwasi Castellanos is a 31 year old male who presents to the Emergency Department for evaluation of chills and perineum pain.  Patient reports he was bit by a dog on his posterior thigh and volar aspect of his right forearm on 9/24 about 2 weeks ago and then had some general myalgias.  Patient then reports a subjective fever starting on 9/29 (about a week ago).  Patient reports that the dog was up-to-date on its vaccinations.  He reports he was given an antibiotic course of clindamycin and Bactrim that use scheduled to complete tomorrow.  Patient reports he also has perineum pain and he has had a pilonidal cyst for a while.  He reports increased perineum pain and reports that he visited urgent care which indicated coccyx abnormality.  He reports it hurts to be seated.  He reports nausea and vomiting.  Patient is febrile here in the ED at 101.7.  He denies cough, but does indicate some shortness of breath.  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  He denies any abdominal trauma.  Patient reports the wounds from the dog bite are healing well.    XR SACRUM AND COCCYX 2 VIEW 10/03/2021, 12:09 PM  IMPRESSION: No acute displaced sacral or coccygeal fracture identified. Chronic angular deformity near the sacrococcygeal junction apex posteriorly. Normal and symmetric sacroiliac and hip joint spaces. Sacral arcuate lines appear intact. Both obturator   rings intact.    Past Medical History  History reviewed. No pertinent past medical  history.  History reviewed. No pertinent surgical history.  ibuprofen (ADVIL/MOTRIN) 200 MG tablet      Allergies   Allergen Reactions     Penicillins Hives     Family History  History reviewed. No pertinent family history.  Social History   Social History     Tobacco Use     Smoking status: Never Smoker     Smokeless tobacco: Never Used   Substance Use Topics     Alcohol use: Not Currently     Drug use: Never      Past medical history, past surgical history, medications, allergies, family history, and social history were reviewed with the patient. No additional pertinent items.       Review of Systems   Constitutional: Positive for fever.   Respiratory: Positive for shortness of breath. Negative for cough.    Gastrointestinal: Positive for nausea and vomiting.   Musculoskeletal: Positive for myalgias.        Perineum pain   Neurological: Positive for light-headedness.   All other systems reviewed and are negative.    A complete review of systems was performed with pertinent positives and negatives noted in the HPI, and all other systems negative.    Physical Exam   BP: (!) 85/49  Pulse: 117  Temp: (!) 101.7  F (38.7  C)  Resp: 16  Weight: 104.3 kg (230 lb)  SpO2: 97 %  Physical Exam  Vitals and nursing note reviewed.   Constitutional:       General: He is not in acute distress.     Appearance: He is well-developed. He is ill-appearing. He is not toxic-appearing or diaphoretic.      Comments: Patient is awake and alert, he about but is otherwise mentating normally and protecting his airway without difficulty.   HENT:      Head: Normocephalic and atraumatic.      Mouth/Throat:      Lips: Pink.      Mouth: Mucous membranes are moist.      Pharynx: Oropharynx is clear. No oropharyngeal exudate.   Eyes:      General: Lids are normal. No scleral icterus.     Extraocular Movements: Extraocular movements intact.      Right eye: No nystagmus.      Left eye: No nystagmus.      Conjunctiva/sclera: Conjunctivae normal.       Pupils: Pupils are equal, round, and reactive to light.   Neck:      Thyroid: No thyromegaly.      Vascular: No JVD.      Trachea: No tracheal deviation.      Comments: No meningeal signs noted.  Cardiovascular:      Rate and Rhythm: Regular rhythm. Tachycardia present.      Pulses: Normal pulses.      Heart sounds: Normal heart sounds. No murmur heard.   No friction rub. No gallop.    Pulmonary:      Effort: Pulmonary effort is normal. No respiratory distress.      Breath sounds: Normal breath sounds.   Abdominal:      General: Bowel sounds are normal. There is no distension.      Palpations: Abdomen is soft. There is no mass.      Tenderness: There is no abdominal tenderness. There is no right CVA tenderness, left CVA tenderness, guarding or rebound.   Genitourinary:     Penis: Normal. No tenderness, discharge or swelling.       Testes: Normal.         Right: Tenderness or swelling not present.         Left: Tenderness or swelling not present.      Epididymis:      Right: Not inflamed. No tenderness.      Left: Not inflamed. No tenderness.      Comments: No skin changes or significant tenderness to palpation in perineal area.  No evidence of perirectal abscess or infected pilonidal cyst  Musculoskeletal:         General: No tenderness. Normal range of motion.      Cervical back: Normal range of motion and neck supple. No erythema or rigidity.      Right lower leg: No edema.      Left lower leg: No edema.   Lymphadenopathy:      Cervical: No cervical adenopathy.      Lower Body: No right inguinal adenopathy. No left inguinal adenopathy.   Skin:     General: Skin is warm and dry.      Capillary Refill: Capillary refill takes less than 2 seconds.      Coloration: Skin is not pale.      Findings: No erythema or rash.      Comments: Healing wounds from dog bite noted right ring and left forearm area.  No obvious signs of infection or tenderness to palpation in these areas.   Neurological:      Mental Status: He is  alert and oriented to person, place, and time.      Cranial Nerves: No cranial nerve deficit.      Sensory: No sensory deficit.      Motor: Motor function is intact.   Psychiatric:         Mood and Affect: Mood and affect normal.         Speech: Speech normal.         Behavior: Behavior normal.         ED Course       8:57 PM  The patient was seen and examined by Zhao Rapp MD in Room ED02.   Procedures       Critical Care Addendum    My initial assessment, based on my review of nursing observations, review of vital signs, focused history and physical exam, established that Kwasi Castellanos has suspicion for sepsis and need for evaluation and early goal-directed therapy, which requires immediate intervention, and therefore he is critically ill.     After the initial assessment, the care team initiated multiple lab tests, initiated IV fluid administration, initiated medication therapy with antibiotics and consulted with Urology to provide stabilization care. Due to the critical nature of this patient, I reassessed nursing observations, vital signs, physical exam and mental status multiple times prior to his disposition.     Time also spent performing documentation, discussion with family to obtain medical information for decision making, reviewing test results and coordination of care.     Critical care time (excluding teaching time and procedures): 30 minutes.   The patient has signs of Severe Sepsis        If one the following conditions is present, a 30 mL/kg bolus is recommended as part of the 6 hour bundle (IBW can be used for BMI >30, or document refusal/contraindication):      1.   Initial hypotension  defined as 2 bps < 90 or map < 65 in the 6hrs before or 6hrs after time zero.     2.  Lactate >4.     The patient has signs of Severe Sepsis as evidenced by:    1. 2 SIRS criteria, AND  2. Suspected infection, AND   3. Organ dysfunction: Lactic Acidosis with value >2.0    Time severe sepsis diagnosis  confirmed: 2137  10/7/21 as this was the time when Lactate resulted, and the level was > 2.0    3 Hour Severe Sepsis Bundle Completion:    1. Initial Lactic Acid Result:   Recent Labs   Lab Test 10/09/21  0142 10/08/21  0203 10/07/21  2137   LACT 0.7 1.4 2.4*     2. Blood Cultures before Antibiotics: Yes  3. Broad Spectrum Antibiotics Administered:  yes       Anti-infectives (From admission through now)    Start     Dose/Rate Route Frequency Ordered Stop    10/08/21 0130  gentamicin (GARAMYCIN) 640 mg in sodium chloride 0.9 % 50 mL intermittent infusion      7 mg/kg × 91 kg (Adjusted)  over 60 Minutes Intravenous ONCE 10/08/21 0114 10/08/21 0426    10/08/21 0110  cefTRIAXone (ROCEPHIN) 1 g vial to attach to  mL bag for ADULTS or NS 50 mL bag for PEDS      1 g  over 15-30 Minutes Intravenous ONCE 10/08/21 0107 10/08/21 0226          4. Fluid volume administered in ED:  Full 30 mL/kg bolus given (see amount below).    BMI Readings from Last 1 Encounters:   10/07/21 29.53 kg/m      30 mL/kg fluids based on weight: 3,130 mL  30 mL/kg fluids based on IBW (must be >= 60 inches tall): 2,470 mL                       Results for orders placed or performed during the hospital encounter of 10/07/21   CT Abdomen Pelvis w Contrast     Status: None    Narrative    EXAM: CT ABDOMEN PELVIS W CONTRAST  LOCATION: [facility]  DATE/TIME: 10/8/2021 12:15 AM    INDICATION: Febrile, hypotensive, severe perineal pain, evaluate for abscess or necrotizing infection.  Please include upper thighs  COMPARISON: None.  TECHNIQUE: CT scan of the abdomen and pelvis was performed following injection of IV contrast. Multiplanar reformats were obtained. Dose reduction techniques were used.  CONTRAST: 112mL isovue-370    FINDINGS:   LOWER CHEST: Normal.    HEPATOBILIARY: Normal.    PANCREAS: Normal.    SPLEEN: Normal.    ADRENAL GLANDS: Normal.    KIDNEYS/BLADDER: No renal mass or hydronephrosis. No  urinary tract obstruction or calcification. Urinary bladder within normal limits.    BOWEL: Normal appendix. No free air, free fluid, or inflammatory change. No obstruction.    LYMPH NODES: Normal.    VASCULATURE: Unremarkable.    PELVIC ORGANS: There is a vague ill-defined hypodense region at the base of the penis and extending slightly into the prostatic segment of the urethra, measuring approximately 0.8 x 1.6 x 2.5 cm as seen on axial for 77 and coronal 57. There is mild   adjacent fat stranding, left greater than right.    MUSCULOSKELETAL: Normal.      Impression    IMPRESSION:   1.  Findings suspicious for prostatitis/prostatic abscess. Consider additional characterization with pelvic MRI.   XR Chest 2 Views     Status: None    Narrative    EXAM: XR CHEST 2 VW  LOCATION: [facility]  DATE/TIME: 10/7/2021 11:50 PM    INDICATION: Fever  COMPARISON: None.      Impression    IMPRESSION: Negative chest.   Extra Blood Culture Bottle     Status: None   Result Value Ref Range    Hold Specimen JIC    Extra Blue Top Tube     Status: None   Result Value Ref Range    Hold Specimen JIC    Extra Red Top Tube     Status: None   Result Value Ref Range    Hold Specimen JIC    Extra Green Top (Lithium Heparin) Tube     Status: None   Result Value Ref Range    Hold Specimen JIC    Extra Purple Top Tube     Status: None   Result Value Ref Range    Hold Specimen JIC    Extra Blood Culture Bottle     Status: None   Result Value Ref Range    Hold Specimen JIC    Comprehensive metabolic panel     Status: Abnormal   Result Value Ref Range    Sodium 138 133 - 144 mmol/L    Potassium 3.6 3.4 - 5.3 mmol/L    Chloride 106 94 - 109 mmol/L    Carbon Dioxide (CO2) 26 20 - 32 mmol/L    Anion Gap 6 3 - 14 mmol/L    Urea Nitrogen 18 7 - 30 mg/dL    Creatinine 1.75 (H) 0.66 - 1.25 mg/dL    Calcium 9.0 8.5 - 10.1 mg/dL    Glucose 103 (H) 70 - 99 mg/dL    Alkaline Phosphatase 92 40 - 150 U/L    AST 28 0  - 45 U/L    ALT 37 0 - 70 U/L    Protein Total 7.6 6.8 - 8.8 g/dL    Albumin 3.7 3.4 - 5.0 g/dL    Bilirubin Total 0.7 0.2 - 1.3 mg/dL    GFR Estimate 51 (L) >60 mL/min/1.73m2   CRP inflammation     Status: Abnormal   Result Value Ref Range    CRP Inflammation 50.0 (H) 0.0 - 8.0 mg/L   Erythrocyte sedimentation rate auto     Status: Abnormal   Result Value Ref Range    Erythrocyte Sedimentation Rate 17 (H) 0 - 15 mm/hr   Asymptomatic COVID-19 Virus (Coronavirus) by PCR Oropharynx     Status: Normal    Specimen: Oropharynx; Swab   Result Value Ref Range    SARS CoV2 PCR Negative Negative    Narrative    Testing was performed using the laurent  SARS-CoV-2 & Influenza A/B Assay on the laurent  [employer]  System.  This test should be ordered for the detection of SARS-COV-2 in individuals who meet SARS-CoV-2 clinical and/or epidemiological criteria. Test performance is unknown in asymptomatic patients.  This test is for in vitro diagnostic use under the FDA EUA for laboratories certified under CLIA to perform moderate and/or high complexity testing. This test has not been FDA cleared or approved.  A negative test does not rule out the presence of PCR inhibitors in the specimen or target RNA in concentration below the limit of detection for the assay. The possibility of a false negative should be considered if the patient's recent exposure or clinical presentation suggests COVID-19.  [facility] are certified under the Clinical Laboratory Improvement Amendments of 1988 (CLIA-88) as qualified to perform moderate and/or high complexity laboratory testing.   CBC with platelets and differential     Status: Abnormal   Result Value Ref Range    WBC Count 6.6 4.0 - 11.0 10e3/uL    RBC Count 4.73 4.40 - 5.90 10e6/uL    Hemoglobin 14.0 13.3 - 17.7 g/dL    Hematocrit 41.2 40.0 - 53.0 %    MCV 87 78 - 100 fL    MCH 29.6 26.5 - 33.0 pg    MCHC 34.0 31.5 - 36.5 g/dL    RDW 12.1 10.0 - 15.0 %    Platelet Count 275 150 - 450  10e3/uL    % Neutrophils 93 %    % Lymphocytes 5 %    % Monocytes 1 %    % Eosinophils 0 %    % Basophils 0 %    % Immature Granulocytes 1 %    NRBCs per 100 WBC 0 <1 /100    Absolute Neutrophils 6.1 1.6 - 8.3 10e3/uL    Absolute Lymphocytes 0.3 (L) 0.8 - 5.3 10e3/uL    Absolute Monocytes 0.0 0.0 - 1.3 10e3/uL    Absolute Eosinophils 0.0 0.0 - 0.7 10e3/uL    Absolute Basophils 0.0 0.0 - 0.2 10e3/uL    Absolute Immature Granulocytes 0.1 (H) <=0.0 10e3/uL    Absolute NRBCs 0.0 10e3/uL   RBC and Platelet Morphology     Status: None   Result Value Ref Range    Platelet Assessment  Automated Count Confirmed. Platelet morphology is normal.     Automated Count Confirmed. Platelet morphology is normal.    RBC Morphology Confirmed RBC Indices    [place] Draw     Status: None    Narrative    The following orders were created for panel order [place] Draw.  Procedure                               Abnormality         Status                     ---------                               -----------         ------                     Extra Blood Culture Bottle[[id number]]                       Final result               Extra Blue Top Tube[[id number]]                              Final result               Extra Red Top Tube[[id number]]                               Final result               Extra Green Top (Lithium...[[id number]]                      Final result               Extra Purple Top Tube[[id number]]                            Final result                 Please view results for these tests on the individual orders.   [place] Draw     Status: None    Narrative    The following orders were created for panel order [place] Draw.  Procedure                               Abnormality         Status                     ---------                               -----------         ------                     Extra Blood Culture Bottle[[id number]]                       Final result                 Please view results for these tests on the  individual orders.   CBC with platelets differential     Status: Abnormal    Narrative    The following orders were created for panel order CBC with platelets differential.  Procedure                               Abnormality         Status                     ---------                               -----------         ------                     CBC with platelets and d...[[id number]]  Abnormal            Final result               RBC and Platelet Morphology[[id number]]                      Final result                 Please view results for these tests on the individual orders.     Medications   0.9% sodium chloride BOLUS (0 mLs Intravenous Stopped 10/8/21 0121)     Followed by   sodium chloride 0.9% infusion (has no administration in time range)   0.9% sodium chloride BOLUS (1,000 mLs Intravenous New Bag 10/8/21 0122)     Followed by   sodium chloride 0.9% infusion (has no administration in time range)   cefTRIAXone (ROCEPHIN) 1 g vial to attach to  mL bag for ADULTS or NS 50 mL bag for PEDS (has no administration in time range)   gentamicin (GARAMYCIN) 640 mg in sodium chloride 0.9 % 50 mL intermittent infusion (has no administration in time range)   iopamidol (ISOVUE-370) solution 100 mL (100 mLs Intravenous Given 10/8/21 0030)   iopamidol (ISOVUE-370) solution 50 mL (12 mLs Intravenous Given 10/8/21 0035)   sodium chloride 0.9 % bag 100mL (68 mLs Intravenous Given 10/8/21 0036)        Assessments & Plan (with Medical Decision Making)   This patient presented to the emergency department complaining of fevers and perineal pain.  He had been bitten by a dog recently but has been on antibiotics he had to prophylax against infection from the dog bite.  He had no evidence to suggest skin or soft tissue infection on exam.  He is tachycardic and hypotensive as well as febrile here suggesting he has severe sepsis.  Lactate was mildly elevated.  Fluid bolus was ordered and labs were sent including blood cultures.   No leukocytosis noted but he does have elevation of his CRP and sed rate at 15 and 17 respectively creatinine is elevated at 1.75.  We do not have a baseline on the patient.  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  Unsure if the elevation of creatinine represents damage from NSAID use versus elevation in the setting of severe sepsis.  CT of the abdomen and pelvis demonstrated findings suspicious for prostatitis/prostatic abscess and recommended additional characterization with a pelvic MRI.  Patient will be started on antibiotics to treat prostatitis and discussed with the urology service and will be admitted to the hospital.    This part of the medical record was transcribed by Cam Smallwood, Medical Scribe, from a dictation done by Zhao Rapp MD.     I have reviewed the nursing notes. I have reviewed the findings, diagnosis, plan and need for follow up with the patient.    New Prescriptions    No medications on file       Final diagnoses:   Acute prostatitis     I, Cam Smallwood, am serving as a trained medical scribe to document services personally performed by Zhao Rapp MD, based on the provider's statements to me.     I, Zhao Rapp MD, was physically present and have reviewed and verified the accuracy of this note documented by Cam Smallwood.    --  Zhao Rapp MD  [facility] EMERGENCY DEPARTMENT  10/7/2021     Zhao Rapp MD  10/10/21 1000

## 2021-10-08 NOTE — PHARMACY-VANCOMYCIN DOSING SERVICE
Pharmacy Aminoglycoside Initial Note  Date of Service 2021  Patient's  1990  31 year old, male    Weight (Adjusted):  91.1 kg    Indication: Abscess, Urinary Tract Infection and Prostatits     Current estimated CrCl = Estimated Creatinine Clearance: 80.2 mL/min (A) (based on SCr of 1.75 mg/dL (H)).    Creatinine for last 3 days  10/7/2021:  9:18 PM Creatinine 1.75 mg/dL     Nephrotoxins and other renal medications (From now, onward)    None          Contrast Orders - past 72 hours (72h ago, onward)    Start     Dose/Rate Route Frequency Ordered Stop    10/08/21 0305  gadobutrol (GADAVIST) injection 10 mL      10 mL Intravenous ONCE 10/08/21 0303 10/08/21 0305    10/08/21 0020  iopamidol (ISOVUE-370) solution 100 mL      100 mL Intravenous ONCE 10/08/21 0015 10/08/21 0030    10/08/21 0020  iopamidol (ISOVUE-370) solution 50 mL      50 mL Intravenous ONCE 10/08/21 0015 10/08/21 0035          Aminoglycoside Levels - past 2 days  No results found for requested labs within last 48 hours.    Aminoglycosides IV Administrations (past 72 hours)                   gentamicin (GARAMYCIN) 640 mg in sodium chloride 0.9 % 50 mL intermittent infusion (mg) 640 mg New Bag 10/08/21 0326              Using traditional dosing as patient has suspected JUAN F.      Patient had received a 7mg/kg dose in ED. However, it was not known he was in JUAN F at the time. The patient will likely retain this load for at least 24 hours, pending renal function. Will start this regimen 24 hours after dose in ED.        Plan:  1.  Start Gentamicin 140 mg (1.5 mg/kg) IV q8h.   2.  Target goals based on conventional dosing  3.  Goal peak level: 4-6 mg/L  4.  Goal trough level: <1 mg/L  5.  Pharmacy will continue to follow and check levels as appropriate in 1-3 Days      Marco Antonio Allison RPH

## 2021-10-08 NOTE — PLAN OF CARE
VS: Temp: 99.7  F (37.6  C) Temp src: Oral BP: 104/51 Pulse: 92   Resp: 19 SpO2: 95 % O2 Device: None (Room air)     O2: Stable on RA   Output: Voiding spontaneously and without difficulty. Intermittent burning with urination per pt    Last BM: 10/7/21   Activity: Up independently in room   Skin: Intact ex scabbed & healing dog bites on L forearm and L buttock   Pain: Denied pain overnight. Slight HA per report but refused offered interventions   CMS: Intact    Dressing: None    Diet: Regular    LDA: PIV R AC infusing   Equipment: IV pump and pole, personal belongings    Plan: Continue to monitor throughout shift. Plan pending    Additional Info:

## 2021-10-08 NOTE — UTILIZATION REVIEW
[facility]  Admission Status; Secondary Review Determination   Admission date:  10/7/2021  8:55 PM    Under the authority of the Utilization Management Committee, the utilization review process indicated a secondary review on the above patient. The review outcome is based on review of the medical records, discussions with staff, and applying clinical experience noted on the date of the review.     (x) Inpatient Status Appropriate - This patient's medical care is consistent with medical management for inpatient care and reasonable inpatient medical practice.     RATIONALE FOR DETERMINATION   31-year-old male was admitted on 10/7 with concern for prostatitis.  He had a recent dog bite and was on outpatient oral antibiotics and the area has been healing well but he came to the ED with signs and symptoms of sepsis as well as perineal pain.  The patient initially had fever to 101.7, heart rate 117, blood pressure 85/49 meeting criteria for sepsis.  CT showed prostatitis and prostatic abscess.  MRI showed 2 cm fluid collection at the penile base.  He was started on gentamicin and levofloxacin.  He has now been transitioned to Zosyn.  Urology has been consulted and plans on taking the patient for cystoscopy.  Infectious disease has been consulted for antibiotic recommendations.This patient is complicated, has multiple ongoing issues, is high risk for rapid clinical deterioration, requires high level cares and stabilization, requires multiple consultant involvement, will likely hospital several days, and is appropriate for inpatient hospitalization at the time of this review.  The severity of illness, intensity of service provided, expected LOS and risk for adverse outcome make the care complex, high risk and appropriate for hospital admission.    At the time of admission with the information available to the attending physician more than 2 nights Hospital complex care was anticipated, based on patient risk of adverse outcome if  treated as outpatient and complex care required.  Inpatient admission is appropriate based on the Medicare guidelines.      The information on this document is developed by the utilization review team in order for the business office to ensure compliance. This only denotes the appropriateness of proper admission status and does not reflect the quality of care rendered.   The definitions of Inpatient Status and Observation Status used in making the determination above are those provided in the CMS Coverage Manual, Chapter 1 and Chapter 6, section 70.4.     Sincerely,   Vadim Arrington DO MPH   Physician Advisor  Utilization Review  [facility]

## 2021-10-08 NOTE — ED NOTES
[facility]   ED Nurse to Floor Handoff     Kwasi Castellanos is a 31 year old male who speaks English and lives with others,  in a home  They arrived in the ED by car from home    ED Chief Complaint: Chills (bitten by a dog a last friday, was seen a few days later and has been taking oral antibiotics, was feeling better and believes wounds are healing well; however, tonight he started having chills and shivers; also complains of pain to perineum where he has had a cyst) and Groin Pain    ED Dx;   Final diagnoses:   Acute prostatitis         Needed?: No    Allergies:   Allergies   Allergen Reactions     Penicillins Hives   .  Past Medical Hx: History reviewed. No pertinent past medical history.   Baseline Mental status: WDL  Current Mental Status changes: at basesline    Infection present or suspected this encounter: cultures pending  Sepsis suspected: No  Isolation type: No active isolations  Patient tested for COVID 19 prior to admission: YES     Activity level - Baseline/Home:  Independent  Activity Level - Current:   Independent    Bariatric equipment needed?: No    In the ED these meds were given:   Medications   0.9% sodium chloride BOLUS (0 mLs Intravenous Stopped 10/8/21 0121)     Followed by   sodium chloride 0.9% infusion ( Intravenous Not Given 10/8/21 0207)   0.9% sodium chloride BOLUS (0 mLs Intravenous Stopped 10/8/21 0204)     Followed by   sodium chloride 0.9% infusion ( Intravenous New Bag 10/8/21 0331)   gentamicin (GARAMYCIN) 640 mg in sodium chloride 0.9 % 50 mL intermittent infusion (640 mg Intravenous New Bag 10/8/21 0326)   iopamidol (ISOVUE-370) solution 100 mL (100 mLs Intravenous Given 10/8/21 0030)   iopamidol (ISOVUE-370) solution 50 mL (12 mLs Intravenous Given 10/8/21 0035)   sodium chloride 0.9 % bag 100mL (68 mLs Intravenous Given 10/8/21 0036)   cefTRIAXone (ROCEPHIN) 1 g vial to attach to  mL bag for ADULTS or NS 50 mL bag  for PEDS (0 g Intravenous Stopped 10/8/21 0226)   sodium chloride (PF) 0.9% PF flush 10 mL (10 mLs Intravenous Given 10/8/21 0305)   gadobutrol (GADAVIST) injection 10 mL (10 mLs Intravenous Given 10/8/21 0305)       Drips running?  Yes    Home pump  No    Current LDAs  Peripheral IV 10/07/21 Right Other (Comment) (Active)   Site Assessment WDL 10/07/21 2131   Line Status Infusing 10/07/21 2131   Dressing Intervention New dressing  10/07/21 2131   Phlebitis Scale 0-->no symptoms 10/07/21 2131   Infiltration Scale 0 10/07/21 2131   Number of days: 1       Labs results:   Labs Ordered and Resulted from Time of ED Arrival Up to the Time of Departure from the ED   COMPREHENSIVE METABOLIC PANEL - Abnormal; Notable for the following components:       Result Value    Creatinine 1.75 (*)     Glucose 103 (*)     GFR Estimate 51 (*)     All other components within normal limits   CRP INFLAMMATION - Abnormal; Notable for the following components:    CRP Inflammation 50.0 (*)     All other components within normal limits   ERYTHROCYTE SEDIMENTATION RATE AUTO - Abnormal; Notable for the following components:    Erythrocyte Sedimentation Rate 17 (*)     All other components within normal limits   CBC WITH PLATELETS AND DIFFERENTIAL - Abnormal; Notable for the following components:    Absolute Lymphocytes 0.3 (*)     Absolute Immature Granulocytes 0.1 (*)     All other components within normal limits   COVID-19 VIRUS (CORONAVIRUS) BY PCR - Normal    Narrative:     Testing was performed using the laurent  SARS-CoV-2 & Influenza A/B Assay on the laurent  Jackeline  System.  This test should be ordered for the detection of SARS-COV-2 in individuals who meet SARS-CoV-2 clinical and/or epidemiological criteria. Test performance is unknown in asymptomatic patients.  This test is for in vitro diagnostic use under the FDA EUA for laboratories certified under CLIA to perform moderate and/or high complexity testing. This test has not been FDA  cleared or approved.  A negative test does not rule out the presence of PCR inhibitors in the specimen or target RNA in concentration below the limit of detection for the assay. The possibility of a false negative should be considered if the patient's recent exposure or clinical presentation suggests COVID-19.  [facility] are certified under the Clinical Laboratory Improvement Amendments of 1988 (CLIA-88) as qualified to perform moderate and/or high complexity laboratory testing.   LACTIC ACID WHOLE BLOOD - Normal   EXTRA BLOOD CULTURE BOTTLE   EXTRA BLUE TOP TUBE   EXTRA RED TOP TUBE   EXTRA GREEN TOP (LITHIUM HEPARIN) TUBE   EXTRA PURPLE TOP TUBE   EXTRA BLOOD CULTURE BOTTLE   RBC AND PLATELET MORPHOLOGY   ROUTINE UA WITH MICROSCOPIC REFLEX TO CULTURE   URINE MACROSCOPIC WITH REFLEX TO MICRO   PERIPHERAL IV CATHETER   ISTAT CG4 GASES LACTATE TAWANA NURSING POCT   BLOOD CULTURE   BLOOD CULTURE   NEISSERIA GONORRHOEAE PCR   CHLAMYDIA TRACHOMATIS PCR   EXTRA TUBE    Narrative:     The following orders were created for panel order [place] Draw.  Procedure                               Abnormality         Status                     ---------                               -----------         ------                     Extra Blood Culture Bottle[[id number]]                       Final result               Extra Blue Top Tube[[id number]]                              Final result               Extra Red Top Tube[[id number]]                               Final result               Extra Green Top (Lithium...[[id number]]                      Final result               Extra Purple Top Tube[[id number]]                            Final result                 Please view results for these tests on the individual orders.   EXTRA TUBE    Narrative:     The following orders were created for panel order [place] Draw.  Procedure                               Abnormality         Status                     ---------                                -----------         ------                     Extra Blood Culture Bottle[[id number]]                       Final result                 Please view results for these tests on the individual orders.   CBC WITH PLATELETS & DIFFERENTIAL    Narrative:     The following orders were created for panel order CBC with platelets differential.  Procedure                               Abnormality         Status                     ---------                               -----------         ------                     CBC with platelets and d...[[id number]]  Abnormal            Final result               RBC and Platelet Morphology[[id number]]                      Final result                 Please view results for these tests on the individual orders.       Imaging Studies:   Recent Results (from the past 24 hour(s))   XR Chest 2 Views    Narrative    EXAM: XR CHEST 2 VW  LOCATION: [facility]  DATE/TIME: 10/7/2021 11:50 PM    INDICATION: Fever  COMPARISON: None.      Impression    IMPRESSION: Negative chest.   CT Abdomen Pelvis w Contrast    Narrative    EXAM: CT ABDOMEN PELVIS W CONTRAST  LOCATION: [facility]  DATE/TIME: 10/8/2021 12:15 AM    INDICATION: Febrile, hypotensive, severe perineal pain, evaluate for abscess or necrotizing infection.  Please include upper thighs  COMPARISON: None.  TECHNIQUE: CT scan of the abdomen and pelvis was performed following injection of IV contrast. Multiplanar reformats were obtained. Dose reduction techniques were used.  CONTRAST: 112mL isovue-370    FINDINGS:   LOWER CHEST: Normal.    HEPATOBILIARY: Normal.    PANCREAS: Normal.    SPLEEN: Normal.    ADRENAL GLANDS: Normal.    KIDNEYS/BLADDER: No renal mass or hydronephrosis. No urinary tract obstruction or calcification. Urinary bladder within normal limits.    BOWEL: Normal appendix. No free air, free fluid, or inflammatory  change. No obstruction.    LYMPH NODES: Normal.    VASCULATURE: Unremarkable.    PELVIC ORGANS: There is a vague ill-defined hypodense region at the base of the penis and extending slightly into the prostatic segment of the urethra, measuring approximately 0.8 x 1.6 x 2.5 cm as seen on axial for 77 and coronal 57. There is mild   adjacent fat stranding, left greater than right.    MUSCULOSKELETAL: Normal.      Impression    IMPRESSION:   1.  Findings suspicious for prostatitis/prostatic abscess. Consider additional characterization with pelvic MRI.       Recent vital signs:   BP 96/44   Pulse 90   Temp 98.9  F (37.2  C) (Oral)   Resp 18   Wt 104.3 kg (230 lb)   SpO2 98%   BMI 29.53 kg/m              Cardiac Rhythm: Normal Sinus  Pt needs tele? No  Skin/wound Issues: None    Code Status: Full Code    Pain control: good    Nausea control: good    Abnormal labs/tests/findings requiring intervention: see epic    Family present during ED course? No   Family Comments/Social Situation comments: n/a    Tasks needing completion: Urine sample    Iman Law, RN  [phone] [facility]

## 2021-10-08 NOTE — PROGRESS NOTES
Course reviewed with ID  Pt was started on Zosyn this am  History of hives from penicillin, when Pt was a child  No rash currently  Plan continue Zosyn, if Pt develops rash, will change to Rocephin and IV Flagyl--discussed with Pt

## 2021-10-08 NOTE — PROGRESS NOTES
Patient was seen, circumstances of admission reviewed with admitting provider.    Please see admission history and physical from this morning    Discussed with urology.    31-year-old male, in good health, admitted with fever, perineal pain/CT and MRI revealed fluid collection which urology feels is adjacent to the prostate though not involving the prostate.  Patient has been started on antibiotics for possible prostatitis.  Urine completely bland.  This is in the setting of recent right thigh dog bite which the patient has been on antibiotics.  Patient presented with acute kidney injury which is improving with IV fluids.    Patient feels improved today though still has perineal discomfort.    Plan   Urology is ordering a urethrogram  IR consultation for possible aspiration of fluid collection, after urethrogram has been reviewed by urology  Continue empiric antibiotic therapy with gentamicin and Levaquin though if prostatitis less likely, regimen may need to be adjusted  IV fluids, monitor renal function  No NSAIDs  Post void residual   ID consult      Addendum:  Per Urology (391)  Urology plans to take Pt to OR for cystoscopy  No IR consult for now  Change antibiotics to Zosyn (coverage for gram neg and anaerobic organisms, and in view of JUAN F)

## 2021-10-08 NOTE — ED NOTES
Sepsis BPA alert has fired and lactate was ordered Yes     Vitals 24 hr (last day)     Date/Time Temp Resp Pulse Pulse Rate Source BP    10/07/21 2050  (!) 101.7 (38.7)  16  117  Monitor  (!) 85/49            WBC Count   Date Value Ref Range Status   10/03/2021 8.3 4.0 - 11.0 10e3/uL Final

## 2021-10-09 ENCOUNTER — ANESTHESIA EVENT (OUTPATIENT)
Dept: SURGERY | Facility: CLINIC | Age: 31
DRG: 728 | End: 2021-10-09
Payer: COMMERCIAL

## 2021-10-09 ENCOUNTER — ANESTHESIA (OUTPATIENT)
Dept: SURGERY | Facility: CLINIC | Age: 31
DRG: 728 | End: 2021-10-09
Payer: COMMERCIAL

## 2021-10-09 LAB
ANION GAP SERPL CALCULATED.3IONS-SCNC: 4 MMOL/L (ref 3–14)
BUN SERPL-MCNC: 10 MG/DL (ref 7–30)
CALCIUM SERPL-MCNC: 8.4 MG/DL (ref 8.5–10.1)
CHLORIDE BLD-SCNC: 111 MMOL/L (ref 94–109)
CO2 SERPL-SCNC: 24 MMOL/L (ref 20–32)
CREAT SERPL-MCNC: 1.33 MG/DL (ref 0.66–1.25)
FLUAV RNA SPEC QL NAA+PROBE: NEGATIVE
FLUBV RNA RESP QL NAA+PROBE: NEGATIVE
GFR SERPL CREATININE-BSD FRML MDRD: 72 ML/MIN/1.73M2
GLUCOSE BLD-MCNC: 100 MG/DL (ref 70–99)
HBV CORE AB SERPL QL IA: NONREACTIVE
HBV SURFACE AB SERPL IA-ACNC: >1000 M[IU]/ML
HBV SURFACE AG SERPL QL IA: NONREACTIVE
HCV AB SERPL QL IA: NONREACTIVE
HIV 1+2 AB+HIV1 P24 AG SERPL QL IA: NONREACTIVE
HOLD SPECIMEN: NORMAL
LACTATE SERPL-SCNC: 0.7 MMOL/L (ref 0.7–2)
POTASSIUM BLD-SCNC: 4.3 MMOL/L (ref 3.4–5.3)
RSV RNA SPEC NAA+PROBE: NEGATIVE
SODIUM SERPL-SCNC: 139 MMOL/L (ref 133–144)
T PALLIDUM AB SER QL: NONREACTIVE

## 2021-10-09 PROCEDURE — C1758 CATHETER, URETERAL: HCPCS | Performed by: UROLOGY

## 2021-10-09 PROCEDURE — 83605 ASSAY OF LACTIC ACID: CPT | Performed by: INTERNAL MEDICINE

## 2021-10-09 PROCEDURE — 370N000017 HC ANESTHESIA TECHNICAL FEE, PER MIN: Performed by: UROLOGY

## 2021-10-09 PROCEDURE — 710N000009 HC RECOVERY PHASE 1, LEVEL 1, PER MIN: Performed by: UROLOGY

## 2021-10-09 PROCEDURE — 0T7D8ZZ DILATION OF URETHRA, VIA NATURAL OR ARTIFICIAL OPENING ENDOSCOPIC: ICD-10-PCS | Performed by: UROLOGY

## 2021-10-09 PROCEDURE — 80048 BASIC METABOLIC PNL TOTAL CA: CPT | Performed by: INTERNAL MEDICINE

## 2021-10-09 PROCEDURE — 360N000076 HC SURGERY LEVEL 3, PER MIN: Performed by: UROLOGY

## 2021-10-09 PROCEDURE — C1769 GUIDE WIRE: HCPCS | Performed by: UROLOGY

## 2021-10-09 PROCEDURE — 120N000002 HC R&B MED SURG/OB UMMC

## 2021-10-09 PROCEDURE — 250N000011 HC RX IP 250 OP 636: Performed by: STUDENT IN AN ORGANIZED HEALTH CARE EDUCATION/TRAINING PROGRAM

## 2021-10-09 PROCEDURE — 250N000011 HC RX IP 250 OP 636: Performed by: INTERNAL MEDICINE

## 2021-10-09 PROCEDURE — 36416 COLLJ CAPILLARY BLOOD SPEC: CPT | Performed by: INTERNAL MEDICINE

## 2021-10-09 PROCEDURE — 258N000003 HC RX IP 258 OP 636: Performed by: INTERNAL MEDICINE

## 2021-10-09 PROCEDURE — 999N000141 HC STATISTIC PRE-PROCEDURE NURSING ASSESSMENT: Performed by: UROLOGY

## 2021-10-09 PROCEDURE — 250N000013 HC RX MED GY IP 250 OP 250 PS 637: Performed by: STUDENT IN AN ORGANIZED HEALTH CARE EDUCATION/TRAINING PROGRAM

## 2021-10-09 PROCEDURE — 36415 COLL VENOUS BLD VENIPUNCTURE: CPT | Performed by: INTERNAL MEDICINE

## 2021-10-09 PROCEDURE — 250N000013 HC RX MED GY IP 250 OP 250 PS 637: Performed by: PEDIATRICS

## 2021-10-09 PROCEDURE — 99233 SBSQ HOSP IP/OBS HIGH 50: CPT | Performed by: INTERNAL MEDICINE

## 2021-10-09 PROCEDURE — 250N000011 HC RX IP 250 OP 636: Performed by: NURSE ANESTHETIST, CERTIFIED REGISTERED

## 2021-10-09 PROCEDURE — 87631 RESP VIRUS 3-5 TARGETS: CPT | Performed by: STUDENT IN AN ORGANIZED HEALTH CARE EDUCATION/TRAINING PROGRAM

## 2021-10-09 PROCEDURE — 250N000009 HC RX 250: Performed by: NURSE ANESTHETIST, CERTIFIED REGISTERED

## 2021-10-09 PROCEDURE — 258N000003 HC RX IP 258 OP 636: Performed by: NURSE ANESTHETIST, CERTIFIED REGISTERED

## 2021-10-09 PROCEDURE — 272N000001 HC OR GENERAL SUPPLY STERILE: Performed by: UROLOGY

## 2021-10-09 PROCEDURE — 250N000025 HC SEVOFLURANE, PER MIN: Performed by: UROLOGY

## 2021-10-09 PROCEDURE — 99222 1ST HOSP IP/OBS MODERATE 55: CPT | Mod: 25 | Performed by: UROLOGY

## 2021-10-09 PROCEDURE — 0T9B70Z DRAINAGE OF BLADDER WITH DRAINAGE DEVICE, VIA NATURAL OR ARTIFICIAL OPENING: ICD-10-PCS | Performed by: UROLOGY

## 2021-10-09 PROCEDURE — 250N000009 HC RX 250: Performed by: STUDENT IN AN ORGANIZED HEALTH CARE EDUCATION/TRAINING PROGRAM

## 2021-10-09 PROCEDURE — 360N000075 HC SURGERY LEVEL 2, PER MIN: Performed by: UROLOGY

## 2021-10-09 PROCEDURE — 52000 CYSTOURETHROSCOPY: CPT | Mod: GC | Performed by: UROLOGY

## 2021-10-09 PROCEDURE — 99232 SBSQ HOSP IP/OBS MODERATE 35: CPT | Performed by: INTERNAL MEDICINE

## 2021-10-09 RX ORDER — ONDANSETRON 2 MG/ML
INJECTION INTRAMUSCULAR; INTRAVENOUS PRN
Status: DISCONTINUED | OUTPATIENT
Start: 2021-10-09 | End: 2021-10-09

## 2021-10-09 RX ORDER — LIDOCAINE HYDROCHLORIDE 20 MG/ML
INJECTION, SOLUTION INFILTRATION; PERINEURAL PRN
Status: DISCONTINUED | OUTPATIENT
Start: 2021-10-09 | End: 2021-10-09

## 2021-10-09 RX ORDER — ONDANSETRON 4 MG/1
4 TABLET, ORALLY DISINTEGRATING ORAL EVERY 30 MIN PRN
Status: DISCONTINUED | OUTPATIENT
Start: 2021-10-09 | End: 2021-10-09 | Stop reason: HOSPADM

## 2021-10-09 RX ORDER — FENTANYL CITRATE 50 UG/ML
INJECTION, SOLUTION INTRAMUSCULAR; INTRAVENOUS PRN
Status: DISCONTINUED | OUTPATIENT
Start: 2021-10-09 | End: 2021-10-09

## 2021-10-09 RX ORDER — OXYCODONE HYDROCHLORIDE 5 MG/1
5 TABLET ORAL EVERY 4 HOURS PRN
Status: DISCONTINUED | OUTPATIENT
Start: 2021-10-09 | End: 2021-10-09 | Stop reason: HOSPADM

## 2021-10-09 RX ORDER — ATROPA BELLADONNA AND OPIUM 16.2; 3 MG/1; MG/1
30 SUPPOSITORY RECTAL EVERY 8 HOURS PRN
Status: DISCONTINUED | OUTPATIENT
Start: 2021-10-09 | End: 2021-10-11 | Stop reason: HOSPADM

## 2021-10-09 RX ORDER — FENTANYL CITRATE 50 UG/ML
25 INJECTION, SOLUTION INTRAMUSCULAR; INTRAVENOUS EVERY 5 MIN PRN
Status: DISCONTINUED | OUTPATIENT
Start: 2021-10-09 | End: 2021-10-11 | Stop reason: HOSPADM

## 2021-10-09 RX ORDER — ATROPA BELLADONNA AND OPIUM 16.2; 6 MG/1; MG/1
60 SUPPOSITORY RECTAL EVERY 8 HOURS PRN
Status: DISCONTINUED | OUTPATIENT
Start: 2021-10-09 | End: 2021-10-09

## 2021-10-09 RX ORDER — ONDANSETRON 2 MG/ML
4 INJECTION INTRAMUSCULAR; INTRAVENOUS EVERY 30 MIN PRN
Status: DISCONTINUED | OUTPATIENT
Start: 2021-10-09 | End: 2021-10-09 | Stop reason: HOSPADM

## 2021-10-09 RX ORDER — SODIUM CHLORIDE, SODIUM LACTATE, POTASSIUM CHLORIDE, CALCIUM CHLORIDE 600; 310; 30; 20 MG/100ML; MG/100ML; MG/100ML; MG/100ML
INJECTION, SOLUTION INTRAVENOUS CONTINUOUS PRN
Status: DISCONTINUED | OUTPATIENT
Start: 2021-10-09 | End: 2021-10-09

## 2021-10-09 RX ORDER — SODIUM CHLORIDE, SODIUM LACTATE, POTASSIUM CHLORIDE, CALCIUM CHLORIDE 600; 310; 30; 20 MG/100ML; MG/100ML; MG/100ML; MG/100ML
INJECTION, SOLUTION INTRAVENOUS CONTINUOUS
Status: DISCONTINUED | OUTPATIENT
Start: 2021-10-09 | End: 2021-10-09 | Stop reason: HOSPADM

## 2021-10-09 RX ORDER — MEPERIDINE HYDROCHLORIDE 25 MG/ML
12.5 INJECTION INTRAMUSCULAR; INTRAVENOUS; SUBCUTANEOUS
Status: DISCONTINUED | OUTPATIENT
Start: 2021-10-09 | End: 2021-10-09 | Stop reason: HOSPADM

## 2021-10-09 RX ORDER — HYDROMORPHONE HYDROCHLORIDE 1 MG/ML
0.2 INJECTION, SOLUTION INTRAMUSCULAR; INTRAVENOUS; SUBCUTANEOUS EVERY 5 MIN PRN
Status: DISCONTINUED | OUTPATIENT
Start: 2021-10-09 | End: 2021-10-11 | Stop reason: HOSPADM

## 2021-10-09 RX ORDER — ACETAMINOPHEN 325 MG/1
975 TABLET ORAL ONCE
Status: COMPLETED | OUTPATIENT
Start: 2021-10-09 | End: 2021-10-09

## 2021-10-09 RX ORDER — PROPOFOL 10 MG/ML
INJECTION, EMULSION INTRAVENOUS PRN
Status: DISCONTINUED | OUTPATIENT
Start: 2021-10-09 | End: 2021-10-09

## 2021-10-09 RX ORDER — TOLTERODINE 2 MG/1
2 CAPSULE, EXTENDED RELEASE ORAL DAILY PRN
Status: DISCONTINUED | OUTPATIENT
Start: 2021-10-09 | End: 2021-10-11 | Stop reason: HOSPADM

## 2021-10-09 RX ORDER — DEXAMETHASONE SODIUM PHOSPHATE 4 MG/ML
INJECTION, SOLUTION INTRA-ARTICULAR; INTRALESIONAL; INTRAMUSCULAR; INTRAVENOUS; SOFT TISSUE PRN
Status: DISCONTINUED | OUTPATIENT
Start: 2021-10-09 | End: 2021-10-09

## 2021-10-09 RX ORDER — LORAZEPAM 2 MG/ML
.5-1 INJECTION INTRAMUSCULAR
Status: DISCONTINUED | OUTPATIENT
Start: 2021-10-09 | End: 2021-10-11 | Stop reason: HOSPADM

## 2021-10-09 RX ORDER — FENTANYL CITRATE 50 UG/ML
25 INJECTION, SOLUTION INTRAMUSCULAR; INTRAVENOUS
Status: DISCONTINUED | OUTPATIENT
Start: 2021-10-09 | End: 2021-10-09 | Stop reason: HOSPADM

## 2021-10-09 RX ORDER — LIDOCAINE 40 MG/G
CREAM TOPICAL
Status: DISCONTINUED | OUTPATIENT
Start: 2021-10-09 | End: 2021-10-09 | Stop reason: HOSPADM

## 2021-10-09 RX ADMIN — ACETAMINOPHEN 975 MG: 325 TABLET, FILM COATED ORAL at 12:15

## 2021-10-09 RX ADMIN — SODIUM CHLORIDE, POTASSIUM CHLORIDE, SODIUM LACTATE AND CALCIUM CHLORIDE: 600; 310; 30; 20 INJECTION, SOLUTION INTRAVENOUS at 09:54

## 2021-10-09 RX ADMIN — FENTANYL CITRATE 25 MCG: 50 INJECTION INTRAMUSCULAR; INTRAVENOUS at 14:30

## 2021-10-09 RX ADMIN — SUGAMMADEX 200 MG: 100 INJECTION, SOLUTION INTRAVENOUS at 13:56

## 2021-10-09 RX ADMIN — OXYCODONE HYDROCHLORIDE 5 MG: 5 TABLET ORAL at 20:31

## 2021-10-09 RX ADMIN — MIDAZOLAM 2 MG: 1 INJECTION INTRAMUSCULAR; INTRAVENOUS at 13:17

## 2021-10-09 RX ADMIN — PIPERACILLIN AND TAZOBACTAM 3.38 G: 3; .375 INJECTION, POWDER, LYOPHILIZED, FOR SOLUTION INTRAVENOUS at 04:47

## 2021-10-09 RX ADMIN — SODIUM CHLORIDE, POTASSIUM CHLORIDE, SODIUM LACTATE AND CALCIUM CHLORIDE: 600; 310; 30; 20 INJECTION, SOLUTION INTRAVENOUS at 16:27

## 2021-10-09 RX ADMIN — ACETAMINOPHEN 650 MG: 325 TABLET, FILM COATED ORAL at 01:28

## 2021-10-09 RX ADMIN — SODIUM CHLORIDE, POTASSIUM CHLORIDE, SODIUM LACTATE AND CALCIUM CHLORIDE: 600; 310; 30; 20 INJECTION, SOLUTION INTRAVENOUS at 13:17

## 2021-10-09 RX ADMIN — FENTANYL CITRATE 25 MCG: 50 INJECTION INTRAMUSCULAR; INTRAVENOUS at 15:14

## 2021-10-09 RX ADMIN — ROCURONIUM BROMIDE 100 MG: 50 INJECTION, SOLUTION INTRAVENOUS at 13:23

## 2021-10-09 RX ADMIN — ONDANSETRON 4 MG: 2 INJECTION INTRAMUSCULAR; INTRAVENOUS at 13:53

## 2021-10-09 RX ADMIN — PIPERACILLIN AND TAZOBACTAM 3.38 G: 3; .375 INJECTION, POWDER, LYOPHILIZED, FOR SOLUTION INTRAVENOUS at 16:27

## 2021-10-09 RX ADMIN — FENTANYL CITRATE 100 MCG: 50 INJECTION, SOLUTION INTRAMUSCULAR; INTRAVENOUS at 13:23

## 2021-10-09 RX ADMIN — PIPERACILLIN AND TAZOBACTAM 3.38 G: 3; .375 INJECTION, POWDER, LYOPHILIZED, FOR SOLUTION INTRAVENOUS at 22:45

## 2021-10-09 RX ADMIN — PROPOFOL 300 MG: 10 INJECTION, EMULSION INTRAVENOUS at 13:23

## 2021-10-09 RX ADMIN — ATROPA BELLADONNA AND OPIUM 1 SUPPOSITORY: 16.2; 3 SUPPOSITORY RECTAL at 17:04

## 2021-10-09 RX ADMIN — DEXAMETHASONE SODIUM PHOSPHATE 4 MG: 4 INJECTION, SOLUTION INTRAMUSCULAR; INTRAVENOUS at 13:39

## 2021-10-09 RX ADMIN — HYDROMORPHONE HYDROCHLORIDE 0.5 MG: 1 INJECTION, SOLUTION INTRAMUSCULAR; INTRAVENOUS; SUBCUTANEOUS at 13:43

## 2021-10-09 RX ADMIN — SODIUM CHLORIDE, POTASSIUM CHLORIDE, SODIUM LACTATE AND CALCIUM CHLORIDE: 600; 310; 30; 20 INJECTION, SOLUTION INTRAVENOUS at 13:37

## 2021-10-09 RX ADMIN — LIDOCAINE HYDROCHLORIDE 100 MG: 20 INJECTION, SOLUTION INFILTRATION; PERINEURAL at 13:23

## 2021-10-09 RX ADMIN — PIPERACILLIN AND TAZOBACTAM 3.38 G: 3; .375 INJECTION, POWDER, LYOPHILIZED, FOR SOLUTION INTRAVENOUS at 09:54

## 2021-10-09 NOTE — PROGRESS NOTES
Patient was seen, course reviewed with team.    Patient had fever during the night  T-max 102.1 around midnight.    Patient states he feels improved this morning, denies lower abdominal or perineal discomfort.  He is voiding without difficulty.  Denies cough, chest pain, shortness of breath.  Last bowel movement was 10/7/2021.    He is n.p.o. for cystoscopy today.    RUG/VCUG on 10/8/2021  Impression:   Suggestion of trace contrast outside the bulbar urethra is nonspecific  and may be related to bulbourethral (Cowper's) glands, however a small  fistulous tract cannot be excluded. No pooling of contrast to confirm  Abscess    Blood pressure during the night systolic 70s, currently low 100s    In no distress, lying in bed, sleepy, easily alerted, fully oriented  Neck supple  Lungs clear  CV regular rhythm without murmurs  Abdomen soft, nondistended, nontender  Genitalia and perineal area were not examined by me.  No lower extremity edema.      Results for NATHALIE RITTER (MRN [id number]) as of 10/9/2021 07:43   Ref. Range 10/9/2021 07:08   Sodium Latest Ref Range: 133 - 144 mmol/L 139   Potassium Latest Ref Range: 3.4 - 5.3 mmol/L 4.3   Chloride Latest Ref Range: 94 - 109 mmol/L 111 (H)   Carbon Dioxide Latest Ref Range: 20 - 32 mmol/L 24   Urea Nitrogen Latest Ref Range: 7 - 30 mg/dL 10   Creatinine Latest Ref Range: 0.66 - 1.25 mg/dL 1.33 (H)   GFR Estimate Latest Ref Range: >60 mL/min/1.73m2 72   Calcium Latest Ref Range: 8.5 - 10.1 mg/dL 8.4 (L)   Anion Gap Latest Ref Range: 3 - 14 mmol/L 4     Urine for chlamydia and GC negative  Blood cultures from 10/7/2021 -.    Results for NATHALIE RITTER (MRN [id number]) as of 10/9/2021 07:43   Ref. Range 10/9/2021 01:42   Lactic Acid Latest Ref Range: 0.7 - 2.0 mmol/L 0.7     Assessment/plan    Fevers, perineal discomfort, fluid collection involving or adjacent to prostate gland.  Etiology not clear.  Symptoms developed while patient was taking clindamycin and Bactrim for  a dog bite to his right thigh and forearm.  Continued fevers though patient notes improvement in perineal discomfort.  Unclear significance of RUG/VCUG.  Patient will be having a cystoscopy today.  Urinalysis on admission bland. IR recommended continued urologic work-up, continued antibiotics before considering aspiration of fluid collection.  Patient remains on empiric Zosyn.  Infectious disease following.  Episodic low blood pressure since admission, normal lactic acid  Plan: Continue Zosyn, monitor exam, vitals.  Continue IV fluids at 125 an hour.  Urologic evaluation as above    Acute kidney injury on admission, likely secondary to infection and volume depletion and perhaps effects of Septra.  Renal function gradually improving.  Patient is voiding well without evidence of retention.  Plan: Continue IV fluids, repeat BMP.

## 2021-10-09 NOTE — ANESTHESIA PROCEDURE NOTES
Airway       Patient location during procedure: OR       Procedure Start/Stop Times: 10/9/2021 1:25 PM  Staff -        CRNA: Dexter Saucedo APRN CRNA       Performed By: CRNAIndications and Patient Condition       Indications for airway management: regino-procedural       Induction type:RSI       Mask difficulty assessment: 0 - not attempted    Final Airway Details       Final airway type: endotracheal airway       Successful airway: ETT - single  Endotracheal Airway Details        ETT size (mm): 7.0       Cuffed: yes       Successful intubation technique: direct laryngoscopy       DL Blade Type: MAC 3       Grade View of Cords: 1       Adjucts: stylet       Position: Right       Measured from: lips       Secured at (cm): 24       Bite block used: None    Post intubation assessment        Placement verified by: capnometry, equal breath sounds and chest rise        Number of attempts at approach: 1       Number of other approaches attempted: 0       Secured with: silk tape       Ease of procedure: easy       Dentition: Intact and Unchanged

## 2021-10-09 NOTE — PROGRESS NOTES
General ID Service: Follow Up Note      Patient:  Kwasi Castellanos, Date of birth 1990, Medical record number [id number]  Date of Visit:  October 9, 2021         Assessment and Recommendations:   ID Problem List:    -Dog bite, 9/24 given bactrim and clindamycin  -PCN allergy, tolerating Zosyn  -Groin pain  -Fevers, chills, rigors  -MR Pelvis shows 'A small irregularly-shaped peripheral enhancing fluid collection is present in the perineum'    Recommendations:    -agree with zosyn, his tolerance makes his PCN allergy less likely  -agree with urology workup    Discussion:    The patient had significant fevers and groin pain following dog bite and then returned with rigors. His only localizing symptom is groin pain and imaging concerning for fluid collection. Given no respiratory symptoms, flu and Covid are unlikely although agree with testing (Sars-Cov-2 negative). I would agree with Urologic procedure to evaluate fluid collection. Would send for aerobic and anaerobic cultures. Pasturella from dog bites is known for skin and soft tissue infections as well as invasive infections. Capnocytophaga infections have also been reported from dog bites.     Recs were discussed with primary team today. Don't hesitate to call with questions.     We will follow along.     Attestation:  I have reviewed today's vital signs, medications, labs and imaging.  Conchis Lagos MD  Pager [phone]          Interval History:       Mr. Kwasi Castellanos is a 31 year old male with no significant medical history except for recent dog bite who presented with chills and perineal pain. I spoke with urology who was concerned that this did not seem like a prostate abscess and noted the patient had very little pain on exam.    The patient reports he was fine until he had a dog bite 9/24. 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 The patient developed generalized  pain, groin pain, fevers. Covid was negative so went to the ED 9/30. They gave him clindamycin and bactrim. He reports he felt better after the antibiotics. However, he reports he developed shaking chills 10/7 and came back to the ED. He reports he has had increasing perineal pain. He says that pain has improved with antibiotics.     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 He now works for [employer]. Born in [place]. Has been to the [employer] several years ago but no recent travel.     He denies ever having respiratory symptoms. He was having nausea on 10/7 but this improved with antibiotics. He denies rashes. Ne denies vomiting or diarrhea. No headaches.          Review of Systems:   Full 9 pt ROS obtained, pertinent positives and negatives as above.          Current Antimicrobials   Current: Zosyn 10/8- present    Prior: bactrim and clindamycin from 9/30-10/7  Gentamycin and Ceftriaxone 10/8-present         Physical Exam:   Ranges for vital signs:  Temp:  [97.4  F (36.3  C)-102.1  F (38.9  C)] 97.8  F (36.6  C)  Pulse:  [55-86] 55  Resp:  [16] 16  BP: ()/(37-66) 114/66  SpO2:  [95 %-100 %] 100 %  No intake or output data in the 24 hours ending 10/09/21 1120  Exam:  GENERAL:  well-developed, well-nourished, sitting in bed in no acute distress.   ENT:  Head is normocephalic, atraumatic. Oropharynx is moist without exudates or ulcers.  EYES:  Eyes have anicteric sclerae.    NECK:  Supple.  LUNGS:  Clear to auscultation.  CARDIOVASCULAR:  Regular rate and rhythm with no murmurs, gallops or rubs.  ABDOMEN:  Normal bowel sounds, soft, nontender.  EXT: Extremities warm and without edema.  SKIN:  No acute rashes.  Line is in place without any surrounding erythema.  NEUROLOGIC:  Grossly nonfocal.         Laboratory Data:   Reviewed.  Pertinent for: CRP 50, ESR 17    Culture data:    10/7 blood cultures ngtd x2  CG/C negative, 10/8 Treponemal test negative          Imaging:     EXAM: MR PELVIS WITHOUT AND WITH CONTRAST  LOCATION: [facility]  DATE/TIME: 10/8/2021 2:33 AM     INDICATION: Fever. Hypotension. Severe perineal pain.  COMPARISON: 10/08/2021 - CT abdomen and pelvis. The report from this study describes a vague ill-defined hypodense region at the base of the penis and extending slightly into the prostatic segment of the urethra.     TECHNIQUE: Routine MRI pelvis without and with IV contrast. Axial, sagittal, and coronal high-resolution T2 and post gadolinium T1 with fat saturation.   CONTRAST: 10 mL Gadavist intravenous contrast.     FINDINGS: A small irregularly-shaped peripheral enhancing fluid collection is present in the perineum, centered in the region of the dorsal base of the penis and abutting the inferior aspect of the prostate gland (series 16 image 28). This measures up to   2.1 x 1.5 cm in axial dimensions. There is a finger-like projection of the fluid collection that extends approximately 3 cm anteriorly within the ventral aspect of the penile shaft (for example, series 16 image 32). Patchy enhancement of the soft   tissues surrounding this fluid collection. No other acute abnormality identified in the pelvis.                                                                      IMPRESSION: A 2 cm irregular-shaped peripheral enhancing fluid collection centered within the region of the penile base and abutting the inferior aspect of the prostate gland. This is nonspecific, but most likely represents an abscess.

## 2021-10-09 NOTE — PROGRESS NOTES
Urology  Progress Note    Admitted yesterday with concern for prostatic abscess  Fevers to 102 overnight, on zosyn  Patient continues to endorse body aches but voiding without difficulty  RUG performed, see results below      Exam  BP 99/54 (BP Location: Left arm)   Pulse 80   Temp (!) 101.6  F (38.7  C) (Oral)   Resp 16   Wt 104.3 kg (230 lb)   SpO2 95%   BMI 29.53 kg/m    No acute distress  Unlabored breathing  Abdomen soft, nontender, nondistended.    UOP NR    Labs  WBC 6.6  Hgb 14  Cr 1.54    Blood culture x 2 10/7 - NGTD  UA negative    CT scan of the abdomen:  IMPRESSION  1.  Findings suspicious for prostatitis/prostatic abscess. Consider additional characterization with pelvic MRI.     MRI Pelvis  IMPRESSION: A 2 cm irregular-shaped peripheral enhancing fluid collection centered within the region of the penile base and abutting the inferior aspect of the prostate gland. This is nonspecific, but most likely represents an abscess.    RUG/VCUG  Impression:   Suggestion of trace contrast outside the bulbar urethra is nonspecific  and may be related to bulbourethral (Cowper's) glands, however a small  fistulous tract cannot be excluded. No pooling of contrast to confirm  abscess.     Assessment/Plan  31 year old y/o male with PMHx recent dog bite (treated with clinda/bactrim) admitted 10/8 with concerns of systemic infection. CT/MRI as above. He remains febrile and endorses 2 week history of perineal pain that is not reproducible on exam.    - OR today for cystoscopy, possible laser unroofing of midline structure seen on imaging  - keep NPO until procedure  - influenza A swab added  - continue antibiotics per primary team (on zosyn)      Seen and examined with the chief resident and staff Dr. Lang.    Linda Lui MD, PGY-3  Urology Resident     Contacting the Urology Team     Please use the following job codes to reach the Urology Team. Note that you must use an in house phone and  that job codes cannot receive text pages.   On weekdays, dial [phone] (or star-star-star [phone] on the new [employer] telephones) then [phone] to reach the Adult Urology resident or PA on call  On weekdays, dial [phone] (or star-star-star 777 on the new [employer] telephones) then [phone] to reach the Pediatric Urology resident  On weeknights and weekends, dial [phone] (or star-star-star 777 on the new [employer] telephones) then [phone] to reach the Urology resident on call (for both Adult and Pediatrics)

## 2021-10-09 NOTE — OR NURSING
PACU to Inpatient Nursing Handoff    Patient Kwasi Castellanos is a 31 year old male who speaks English.   Procedure Procedure(s):  CYSTOSCOPY, Urethral Dilation and Fitch Placement   Surgeon(s) Primary: Bishop Lang MD  Resident - Assisting: Linda Lui MD     Allergies   Allergen Reactions     Penicillins Hives       Isolation  [unfilled]     Past Medical History   has no past medical history on file.    Anesthesia General   Dermatome Level     Preop Meds acetaminophen (Tylenol) - time given: 1215   Nerve block Not applicable   Intraop Meds dexamethasone (Decadron)  fentanyl (Sublimaze): 100 mcg total  hydromorphone (Dilaudid): 0.5 mg total  ondansetron (Zofran): last given at 1353   Local Meds No   Antibiotics Not applicable     Pain Patient Currently in Pain: yes   PACU meds  fentanyl (Sublimaze): 25 mcg (total dose) last given at 1430    PCA / epidural No   Capnography     Telemetry ECG Rhythm: Normal sinus rhythm   Inpatient Telemetry Monitor Ordered? No        Labs Glucose Lab Results   Component Value Date     10/09/2021       Hgb Lab Results   Component Value Date    HGB 14.0 10/07/2021       INR No results found for: INR   PACU Imaging Not applicable     Wound/Incision Incision/Surgical Site 10/09/21 Penis (Active)   Incision Assessment UTV 10/09/21 1430   Dressing Intervention Open to air / No Dressing 10/09/21 1430   Number of days: 0      CMS        Equipment Not applicable   Other LDA       IV Access Peripheral IV 10/07/21 Right Other (Comment) (Active)   Site Assessment WDL 10/09/21 1405   Line Status Infusing 10/09/21 1405   Dressing Intervention New dressing  10/07/21 2131   Phlebitis Scale 0-->no symptoms 10/09/21 1405   Infiltration Scale 0 10/09/21 1405   Infiltration Site Treatment Method  None 10/08/21 2000   Number of days: 2      Blood Products Not applicable EBL 0 mL   Intake/Output Date 10/09/21 0700 - 10/10/21 0659   Shift 1501-2766 5423-6934 1269-0966 24  Hour Total   INTAKE   I.V. 1200   1200   Shift Total(mL/kg) 1200(11.5)   1200(11.5)   OUTPUT   Blood 0   0   Shift Total(mL/kg) 0(0)   0(0)   Weight (kg) 104.33 104.33 104.33 104.33      Drains / Fitch Urethral Catheter Double-lumen;Non-latex 16 fr (Active)   Collection Container Standard 10/09/21 1405   Securement Method Securing device (Describe) 10/09/21 1405   Number of days: 0      Time of void PreOp Void Prior to Procedure: 1200 (10/09/21 1211)    PostOp      Diapered? No   Bladder Scan     PO    ice chips     Vitals    B/P: 106/60  T: 97.8  F (36.6  C)    Temp src: Oral  P:  Pulse: 61 (10/09/21 1445)          R: 12  O2:  SpO2: 97 %    O2 Device: None (Room air) (10/09/21 1445)    Oxygen Delivery: 8 LPM (10/09/21 1405)         Family/support present significant other   Patient belongings     Patient transported on cart   DC meds/scripts (obs/outpt) Not applicable   Inpatient Pain Meds Released? Yes       Special needs/considerations None   Tasks needing completion None       Jazmin Winkler, RN  [facility] [id number]

## 2021-10-09 NOTE — ANESTHESIA PREPROCEDURE EVALUATION
Anesthesia Pre-Procedure Evaluation    Patient: Kwasi Castellanos   MRN: [id number] : 1990        Preoperative Diagnosis: Abscess [L02.91]    Procedure : Procedure(s):  CYSTOSCOPY, WITH TRANSURETHRAL RESECTION PROSTATE          History reviewed. No pertinent past medical history.   History reviewed. No pertinent surgical history.   Allergies   Allergen Reactions     Penicillins Hives      Social History     Tobacco Use     Smoking status: Never Smoker     Smokeless tobacco: Never Used   Substance Use Topics     Alcohol use: Not Currently      Wt Readings from Last 1 Encounters:   10/07/21 104.3 kg (230 lb)           Physical Exam    Airway        Mallampati: I   TM distance: > 3 FB   Neck ROM: full   Mouth opening: > 3 cm    Respiratory Devices and Support         Dental  no notable dental history         Cardiovascular   cardiovascular exam normal          Pulmonary   pulmonary exam normal                OUTSIDE LABS:  CBC:   Lab Results   Component Value Date    WBC 6.6 10/07/2021    WBC 8.3 10/03/2021    HGB 14.0 10/07/2021    HGB 13.5 10/03/2021    HCT 41.2 10/07/2021    HCT 39.6 (L) 10/03/2021     10/07/2021     10/03/2021     BMP:   Lab Results   Component Value Date     10/09/2021     10/08/2021    POTASSIUM 4.3 10/09/2021    POTASSIUM 4.3 10/08/2021    CHLORIDE 111 (H) 10/09/2021    CHLORIDE 108 10/08/2021    CO2 24 10/09/2021    CO2 25 10/08/2021    BUN 10 10/09/2021    BUN 16 10/08/2021    CR 1.33 (H) 10/09/2021    CR 1.54 (H) 10/08/2021     (H) 10/09/2021     (H) 10/08/2021     COAGS: No results found for: PTT, INR, FIBR  POC: No results found for: BGM, HCG, HCGS  HEPATIC:   Lab Results   Component Value Date    ALBUMIN 3.7 10/07/2021    PROTTOTAL 7.6 10/07/2021    ALT 37 10/07/2021    AST 28 10/07/2021    ALKPHOS 92 10/07/2021    BILITOTAL 0.7 10/07/2021     OTHER:   Lab Results   Component Value Date    PH 7.53 (H) 10/07/2021    LACT 0.7 10/09/2021    JOSE E  8.4 (L) 10/09/2021    CRP 50.0 (H) 10/07/2021    SED 17 (H) 10/07/2021       Anesthesia Plan    ASA Status:  1   NPO Status:  NPO Appropriate    Anesthesia Type: General.     - Airway: ETT   Induction: Intravenous, Propofol, RSI.   Maintenance: Balanced.        Consents    Anesthesia Plan(s) and associated risks, benefits, and realistic alternatives discussed. Questions answered and patient/representative(s) expressed understanding.     - Discussed with:  Patient      - Extended Intubation/Ventilatory Support Discussed: No.      - Patient is DNR/DNI Status: No    Use of blood products discussed: No .     Postoperative Care    Pain management: IV analgesics, Oral pain medications, Multi-modal analgesia.   PONV prophylaxis: Dexamethasone or Solumedrol, Ondansetron (or other 5HT-3)     Comments:                Jj Bazan MD

## 2021-10-09 NOTE — OP NOTE
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 The prostate was non-obstructing but the bladder neck appeared about 18Fr and was high and tight. Moderately trabeculated, large capacity bladder.    ANESTHESIA: General   INTRAVENOUS FLUIDS: See dictated anesthesia record  ESTIMATED BLOOD LOSS: 0 mL.     SPECIMENS:   1. none    DRAINS:   20Fr stein catheter    INDICATIONS FOR PROCEDURE: Kwasi Castellanos is a(n) 31 year old male who presented 10/7 with fevers and groin pain. Imaging showed concern for a perineal fluid collection abutting the urethra. After discussion of the risks, benefits and alternatives of the procedure, the patient agreed to proceed with transurethral resection of his bladder tumor.     DESCRIPTION OF PROCEDURE: After verifying informed consent, the patient was taken to the operating room. Adequate anesthesia was induced, the patient was placed in the dorsal lithotomy position and prepped and draped in standard sterile fashion. A timeout was performed to verify correct patient and procedure. Pneumo boots and perioperative antibiotics were in place before the procedure commenced.     A 22-Belgian rigid cystoscope was inserted into a well lubricated urethra. The anterior penile urethra appeared normal, however at the distal bulbar urethra we encountered ~12 Fr stricture. Just beyond it there appeared to be another ~12 Fr membranous stricture.    We switched to a flexible 16 Fr cystoscope and passed a sensor wire through the lumen into the bladder. We were able to  gently push past these annular strictures over the wire to the prostatic urethra. There were bilateral dilated cavities at the inferior aspect (left larger than right) lined with urothelium, consistent with dilated cowper's gland. There was no purulence or concern for infection. Prostate was 2.5 cm and non-obstructing.    The bladder neck appeared narrow, high and tight at around 18Fr. The bladder itself was quite trabeculated. Ureteral orifices were orthotopic. There were no tumors, stones, or diverticuli and media was clear.    We then backed our scope out over the wire and used landeros sounds to serially dilate to 22 Fr. A 20 Fr stein catheter was then passed with minimal resistance into the bladder.    This concluded our procedure. The patient was awoken from anesthesia and transferred to the recovery room in stable condition.     POSTOPERATIVE PLAN:   1. Continue antibiotics per ID and primary team  2. Continue stein catheter for one week. Follow up with reconstructive urologist  3. Ordered PRN B&O suppositories and detrol for bladder spasms, as well as urethral lubrication.     I, Bishop Lang, was present and participatory for the entirety of the procedure

## 2021-10-09 NOTE — ANESTHESIA CARE TRANSFER NOTE
Patient: Kwasi Castellanos    Procedure: Procedure(s):  CYSTOSCOPY, Urethral Dilation and Fitch Placement       Diagnosis: Abscess [L02.91]  Diagnosis Additional Information: No value filed.    Anesthesia Type:   General     Note:    Oropharynx: oropharynx clear of all foreign objects  Level of Consciousness: awake  Oxygen Supplementation: face mask  Level of Supplemental Oxygen (L/min / FiO2): 6  Independent Airway: airway patency satisfactory and stable  Dentition: dentition unchanged  Vital Signs Stable: post-procedure vital signs reviewed and stable  Report to RN Given: handoff report given  Patient transferred to: PACU    Handoff Report: Identifed the Patient, Identified the Reponsible Provider, Reviewed the pertinent medical history, Discussed the surgical course, Reviewed Intra-OP anesthesia mangement and issues during anesthesia, Set expectations for post-procedure period and Allowed opportunity for questions and acknowledgement of understanding      Vitals:  Vitals Value Taken Time   /69 10/09/21 1415   Temp     Pulse 66 10/09/21 1426   Resp 9 10/09/21 1426   SpO2 97 % 10/09/21 1426   Vitals shown include unvalidated device data.    Electronically Signed By: KYAW Corrigan CRNA  October 9, 2021  2:27 PM

## 2021-10-09 NOTE — PLAN OF CARE
Pt is A&Ox4. Elevated temp and low BPs, Triggered sepsis protocol, lactic is 0.7 Tylenol given for fever.  LS clear, on RA. BS active, LBM on 10/7/2021. NPO at midnight for surgery tomorrow. Pre op surgical bath and pre op checklist done. Denies pain. L PIV is patent and infusing NS at 100ml/hr. Pt up independently. Continue to monitor.

## 2021-10-09 NOTE — BRIEF OP NOTE
[facility]    Brief Operative Note    Pre-operative diagnosis: Abscess [L02.91]  Post-operative diagnosis Same as pre-operative diagnosis    Procedure: Procedure(s):  CYSTOSCOPY, Urethral Dilation and Stein Placement  Surgeon: Surgeon(s) and Role:     * Bishop Lang MD - Primary     * Linda Lui MD - Resident - Assisting  Anesthesia: General   Estimated Blood Loss: None    Drains: 20 Fr stein  Specimens: * No specimens in log *  Findings:   see operative report.  Complications: None.  Implants: * No implants in log *    Plan  Continue stein x 1 week

## 2021-10-09 NOTE — PLAN OF CARE
VS: Soft BP's since admission  Blood pressure 99/54, pulse 80, temperature (!) 101.6  F (38.7  C), temperature source Oral, resp. rate 16, weight 104.3 kg (230 lb), SpO2 95 %.   O2: >92% on RA   Output: Voids spontaneously, up to bathroom   Last BM: 10/7/21   Activity: Independent   Skin: Intact and WNL  Scabs to R forearm and R buttock from prior dog bites   Pain: Denied   Neuro/CMS: A+Ox4  CMS intact. Denies numbness and tingling.    Dressing: None   Diet: Regular  NPO at Midnight   LDA: R forearm PIV infusing LR @ 100ml/hr   Equipment: Personal belongings: Cell phone  Other: IV pump/pole   Plan: NPO at Midnight for surgery tomorrow  Biopsy w/ IR and un-mireya of cyst w/ Urology   Additional Info: Febrile (temp of 102) at 1500. Declined Tylenol. Cool wash cloth placed on forehead. Re-check was 101.6

## 2021-10-10 LAB
ALBUMIN SERPL-MCNC: 2.5 G/DL (ref 3.4–5)
ALP SERPL-CCNC: 57 U/L (ref 40–150)
ALT SERPL W P-5'-P-CCNC: 26 U/L (ref 0–70)
ANION GAP SERPL CALCULATED.3IONS-SCNC: <1 MMOL/L (ref 3–14)
AST SERPL W P-5'-P-CCNC: 14 U/L (ref 0–45)
BILIRUB SERPL-MCNC: 0.3 MG/DL (ref 0.2–1.3)
BUN SERPL-MCNC: 11 MG/DL (ref 7–30)
CALCIUM SERPL-MCNC: 8.2 MG/DL (ref 8.5–10.1)
CHLORIDE BLD-SCNC: 112 MMOL/L (ref 94–109)
CO2 SERPL-SCNC: 26 MMOL/L (ref 20–32)
CREAT SERPL-MCNC: 1.09 MG/DL (ref 0.66–1.25)
CRP SERPL-MCNC: 78 MG/L (ref 0–8)
ERYTHROCYTE [DISTWIDTH] IN BLOOD BY AUTOMATED COUNT: 12.2 % (ref 10–15)
GFR SERPL CREATININE-BSD FRML MDRD: >90 ML/MIN/1.73M2
GLUCOSE BLD-MCNC: 141 MG/DL (ref 70–99)
HCT VFR BLD AUTO: 35.7 % (ref 40–53)
HGB BLD-MCNC: 11.8 G/DL (ref 13.3–17.7)
MCH RBC QN AUTO: 29.5 PG (ref 26.5–33)
MCHC RBC AUTO-ENTMCNC: 33.1 G/DL (ref 31.5–36.5)
MCV RBC AUTO: 89 FL (ref 78–100)
PLATELET # BLD AUTO: 209 10E3/UL (ref 150–450)
POTASSIUM BLD-SCNC: 4 MMOL/L (ref 3.4–5.3)
PROCALCITONIN SERPL-MCNC: 38.72 NG/ML
PROT SERPL-MCNC: 6.1 G/DL (ref 6.8–8.8)
RBC # BLD AUTO: 4 10E6/UL (ref 4.4–5.9)
SODIUM SERPL-SCNC: 138 MMOL/L (ref 133–144)
WBC # BLD AUTO: 12.8 10E3/UL (ref 4–11)

## 2021-10-10 PROCEDURE — 258N000003 HC RX IP 258 OP 636: Performed by: INTERNAL MEDICINE

## 2021-10-10 PROCEDURE — 250N000013 HC RX MED GY IP 250 OP 250 PS 637: Performed by: PEDIATRICS

## 2021-10-10 PROCEDURE — 80053 COMPREHEN METABOLIC PANEL: CPT | Performed by: INTERNAL MEDICINE

## 2021-10-10 PROCEDURE — 120N000002 HC R&B MED SURG/OB UMMC

## 2021-10-10 PROCEDURE — 99233 SBSQ HOSP IP/OBS HIGH 50: CPT | Performed by: INTERNAL MEDICINE

## 2021-10-10 PROCEDURE — 99232 SBSQ HOSP IP/OBS MODERATE 35: CPT | Performed by: INTERNAL MEDICINE

## 2021-10-10 PROCEDURE — 85027 COMPLETE CBC AUTOMATED: CPT | Performed by: INTERNAL MEDICINE

## 2021-10-10 PROCEDURE — 250N000011 HC RX IP 250 OP 636: Mod: JZ | Performed by: INTERNAL MEDICINE

## 2021-10-10 PROCEDURE — 84145 PROCALCITONIN (PCT): CPT | Performed by: INTERNAL MEDICINE

## 2021-10-10 PROCEDURE — 36415 COLL VENOUS BLD VENIPUNCTURE: CPT | Performed by: INTERNAL MEDICINE

## 2021-10-10 PROCEDURE — 250N000013 HC RX MED GY IP 250 OP 250 PS 637: Performed by: STUDENT IN AN ORGANIZED HEALTH CARE EDUCATION/TRAINING PROGRAM

## 2021-10-10 PROCEDURE — 86140 C-REACTIVE PROTEIN: CPT | Performed by: INTERNAL MEDICINE

## 2021-10-10 RX ORDER — PHENAZOPYRIDINE HYDROCHLORIDE 100 MG/1
100 TABLET, FILM COATED ORAL
Status: DISCONTINUED | OUTPATIENT
Start: 2021-10-10 | End: 2021-10-11 | Stop reason: HOSPADM

## 2021-10-10 RX ADMIN — PIPERACILLIN AND TAZOBACTAM 3.38 G: 3; .375 INJECTION, POWDER, LYOPHILIZED, FOR SOLUTION INTRAVENOUS at 04:41

## 2021-10-10 RX ADMIN — PIPERACILLIN AND TAZOBACTAM 3.38 G: 3; .375 INJECTION, POWDER, LYOPHILIZED, FOR SOLUTION INTRAVENOUS at 10:46

## 2021-10-10 RX ADMIN — OXYCODONE HYDROCHLORIDE 5 MG: 5 TABLET ORAL at 12:10

## 2021-10-10 RX ADMIN — PHENAZOPYRIDINE HYDROCHLORIDE 100 MG: 100 TABLET ORAL at 17:32

## 2021-10-10 RX ADMIN — PHENAZOPYRIDINE HYDROCHLORIDE 100 MG: 100 TABLET ORAL at 12:07

## 2021-10-10 RX ADMIN — SODIUM CHLORIDE, POTASSIUM CHLORIDE, SODIUM LACTATE AND CALCIUM CHLORIDE: 600; 310; 30; 20 INJECTION, SOLUTION INTRAVENOUS at 01:20

## 2021-10-10 RX ADMIN — PIPERACILLIN AND TAZOBACTAM 3.38 G: 3; .375 INJECTION, POWDER, LYOPHILIZED, FOR SOLUTION INTRAVENOUS at 15:41

## 2021-10-10 RX ADMIN — OXYCODONE HYDROCHLORIDE 5 MG: 5 TABLET ORAL at 22:04

## 2021-10-10 RX ADMIN — PIPERACILLIN AND TAZOBACTAM 3.38 G: 3; .375 INJECTION, POWDER, LYOPHILIZED, FOR SOLUTION INTRAVENOUS at 21:58

## 2021-10-10 RX ADMIN — PHENAZOPYRIDINE HYDROCHLORIDE 100 MG: 100 TABLET ORAL at 08:38

## 2021-10-10 NOTE — PROGRESS NOTES
General ID Service: Follow Up Note      Patient:  Kwasi Castellanos, Date of birth 1990, Medical record number [id number]  Date of Visit:  October 10, 2021         Assessment and Recommendations:   ID Problem List:    -Dog bite, 9/24 given bactrim and clindamycin  -PCN allergy, tolerating Zosyn  -Groin pain  -Fevers, chills, rigors, resolved  -Procalcitonin 38  -MR Pelvis shows 'A small irregularly-shaped peripheral enhancing fluid collection is present in the perineum'  -Cystoscopy showed urethral strictures and dilated cowper's glands    Recommendations:    -agree with zosyn  -agree with keeping him overnight to observe  -ok with discharge tomorrow assuming all is well  -agree with sending him on PO augmentin for 7-10 days. He has a reported PCN allergy but tolerated zosyn so likely not a true allergy.     Discussion:    Patient is now afebrile and doing well. Unclear why he had rigors and sepsis picture. Groin pain explained by cystoscopy findings but that does not explain rigors. I considered an unusual urinary pathogen given [place] but no eosinophilia and UA is very bland.     I discussed with Dr. Chen and we think observing him overnight and sending tomorrow on augmentin is reasonable. I think he does not have a PCN allergy given his tolerance of Zosyn a PCN. However Levoqauin or ciprofloxacin and flagyl is a reasonable alternative if desired. I would do 7-10 days for sepsis.     Recs were discussed with primary team today. Don't hesitate to call with questions.     Attestation:  I have reviewed today's vital signs, medications, labs and imaging.  Conchis Lagos MD  Pager [phone]          Interval History:     Patient feels well. No fevers or chills. Reports his stein is uncomfortable. No pain. No headaches, visual changes.   No skin rashes. Nothing else. Eating lunch.          Review of Systems:   Full 9 pt ROS obtained,  pertinent positives and negatives as above.          Current Antimicrobials   Current:    Zosyn         Physical Exam:   Ranges for vital signs:  Temp:  [96.1  F (35.6  C)-97.8  F (36.6  C)] 96.1  F (35.6  C)  Pulse:  [42-81] 42  Resp:  [12-19] 18  BP: ()/(51-70) 109/63  SpO2:  [92 %-99 %] 95 %    Intake/Output Summary (Last 24 hours) at 10/10/2021 1119  Last data filed at 10/10/2021 0837  Gross per 24 hour   Intake 1341 ml   Output 2325 ml   Net -984 ml     Exam:  GENERAL:  well-developed, well-nourished, sitting in bed in no acute distress.   ENT:  Head is normocephalic, atraumatic. Oropharynx is moist without exudates or ulcers.  EYES:  Eyes have anicteric sclerae.    NECK:  Supple.  LUNGS:  Clear to auscultation.  CARDIOVASCULAR:  Regular rate and rhythm with no murmurs, gallops or rubs.  ABDOMEN:  Normal bowel sounds, soft, nontender.  EXT: Extremities warm and without edema.  SKIN:  No acute rashes.  Line is in place without any surrounding erythema.  NEUROLOGIC:  Grossly nonfocal.         Laboratory Data:   Reviewed.  Pertinent for: WBC 12.8, CRP 78, Procalcitonin 38.7    Culture data:    Collected Updated Procedure Result Status    10/09/2021 1001 10/09/2021 1138 Influenza A and B & RSV by PCR [[id number]]    Swab from Nasopharyngeal    Final result Component Value   Influenza A target Negative   Influenza B target Negative   RSV target Negative           10/08/2021 0541 10/08/2021 1159 Neisseria gonorrhoeae PCR [[id number]]   Urine, Voided    Final result Component Value   Neisseria gonorrhoeae Negative   Negative for N. gonorrhoeae rRNA by transcription mediated amplification. A negative result by transcription mediated amplification does not preclude the presence of C. trachomatis infection because results are dependent on proper and adequate collection, absence of inhibitors and sufficient rRNA to be detected.           10/08/2021 0541 10/08/2021 1159 Chlamydia trachomatis PCR [[id number]]    Urine, Voided    Final result Component Value   Chlamydia trachomatis Negative   A negative result by transcription mediated amplification does not preclude the presence of C. trachomatis infection because results are dependent on proper and adequate collection, absence of inhibitors and sufficient rRNA to be detected.           10/07/2021 2308 10/07/2021 2357 Asymptomatic COVID-19 Virus (Coronavirus) by PCR Oropharynx [[id number]]    Swab from Oropharynx    Final result Component Value   SARS CoV2 PCR Negative   NEGATIVE: SARS-CoV-2 (COVID-19) RNA not detected, presumed negative.           10/07/2021 2118 10/10/2021 0602 Blood Culture Peripheral Blood [[id number]]   Peripheral Blood    Preliminary result Component Value   Culture No growth after 2 days P              10/07/2021 2118 10/10/2021 0602 Blood Culture Peripheral Blood [[id number]]   Peripheral Blood    Preliminary result Component Value   Culture No growth after 2 days P                 Imaging:     Reviewed. None new.

## 2021-10-10 NOTE — PLAN OF CARE
VS: VSS   O2: >90% on RA   Output: Stein in place with adequate output; will discharge with stein.   Last BM: 10/10   Activity: WBAT; ind with mobility   Up for meals? Yes   Skin: Intact except for small scabs from dog bite   Pain: 5mg oxycodone x1   CMS: Intact   Dressing: None   Diet: Regular   LDA: PIV SL btw abx - receiving zosyn q6h   Equipment: IV pole   Plan: TBD; hopeful to D.C. tomorrow.   Additional Info:

## 2021-10-10 NOTE — PLAN OF CARE
VS: /51 (BP Location: Left arm)   Pulse 51   Temp 97.1  F (36.2  C) (Oral)   Resp 19   Wt 104.3 kg (230 lb)   SpO2 92%   BMI 29.53 kg/m    Pt denies chest pain.    O2: >90% on RA   Output: Stein in place, adequate output.    Last BM: 10/8/21 per pt report   Activity: Independent, steady gait.    Up for meals? N/A   Skin: Intact except Scab to R forearm and R buttock from dog bite.    Pain: Managed with PRN Tylenol and Oxycodone.    CMS: Intact, pt denies numbness or tingling. A&O x4   Dressing: None   Diet: Regular   LDA: PIV R forearm - infusing   Equipment: IV pole/pump, PCD's, and personal belongings.    Plan: Continue antibiotics per ID and primary team. Continue stein catheter for one week and follow up with urology per Urology note.   Additional Info:

## 2021-10-10 NOTE — PROGRESS NOTES
Patient was seen, course reviewed with nursing staff, Urology team    Cystoscopy findings reviewed with team    Pt notes penile discomfort r/t catheter  He denies perineal discomfort, fevers, chills, abd pain    Afebrile since 10/09/21 early am  VSS  Sleepy, easily alerts, fully oriented  Appears comfortable  Lungs clear  CV rrr  Abd soft, non-tender    Results for NATHALIE RITTER (MRN [id number]) as of 10/10/2021 10:42   Ref. Range 10/10/2021 06:06   Sodium Latest Ref Range: 133 - 144 mmol/L 138   Potassium Latest Ref Range: 3.4 - 5.3 mmol/L 4.0   Chloride Latest Ref Range: 94 - 109 mmol/L 112 (H)   Carbon Dioxide Latest Ref Range: 20 - 32 mmol/L 26   Urea Nitrogen Latest Ref Range: 7 - 30 mg/dL 11   Creatinine Latest Ref Range: 0.66 - 1.25 mg/dL 1.09   GFR Estimate Latest Ref Range: >60 mL/min/1.73m2 >90   Calcium Latest Ref Range: 8.5 - 10.1 mg/dL 8.2 (L)   Anion Gap Latest Ref Range: 3 - 14 mmol/L <1 (L)   Albumin Latest Ref Range: 3.4 - 5.0 g/dL 2.5 (L)   Protein Total Latest Ref Range: 6.8 - 8.8 g/dL 6.1 (L)   Bilirubin Total Latest Ref Range: 0.2 - 1.3 mg/dL 0.3   Alkaline Phosphatase Latest Ref Range: 40 - 150 U/L 57   ALT Latest Ref Range: 0 - 70 U/L 26   AST Latest Ref Range: 0 - 45 U/L 14   CRP Inflammation Latest Ref Range: 0.0 - 8.0 mg/L 78.0 (H)   Procalcitonin Latest Ref Range: <0.05 ng/mL 38.72 (HH)   Glucose Latest Ref Range: 70 - 99 mg/dL 141 (H)   WBC Latest Ref Range: 4.0 - 11.0 10e3/uL 12.8 (H)   Hemoglobin Latest Ref Range: 13.3 - 17.7 g/dL 11.8 (L)   Hematocrit Latest Ref Range: 40.0 - 53.0 % 35.7 (L)   Platelet Count Latest Ref Range: 150 - 450 10e3/uL 209   RBC Count Latest Ref Range: 4.40 - 5.90 10e6/uL 4.00 (L)   MCV Latest Ref Range: 78 - 100 fL 89   MCH Latest Ref Range: 26.5 - 33.0 pg 29.5   MCHC Latest Ref Range: 31.5 - 36.5 g/dL 33.1   RDW Latest Ref Range: 10.0 - 15.0 % 12.2       Blood cultures from 10/7/2021 -.      Assessment/plan    Fevers, perineal discomfort, fluid collection  involving or adjacent to prostate gland.    Symptoms developed while patient was taking clindamycin and Bactrim for a dog bite to his right thigh and forearm. Receiving Zosyn as empiric treatment for UTI or abscess. BC neg, afebrile for 36 hours, perineal discomfort resolved. UA on admission bland (no UC obtained)  Cysto revealed membranous urethral stricture with dilated bilateral Cowper's glands, no abscess.  Urology believes that fluid collection at the penile base is related to dilated Cowper's glands, and not an abscess. Etiology of fever, markedly elevated procal not clear, if not felt to be urinary source.  Urology does not recommend short term re-imaging.  Plan Continue Unasyn for now, review with ID.  Per Urology, Fitch for one week, Urology f/u in 1 week    JUAN F  Likely secondary to volume depletion on admission, Septra, ? Sepsis, ? Urinary retention.  Resolved with IV fluids  Plan reduce IV fluids, discontinue when po adequate.     Disposition unclear, to be predicated on clinical course    DVT proph, PCD, ambulation

## 2021-10-10 NOTE — PROGRESS NOTES
"Urology  Progress Note    OR yesterday revealed urethral stricture, no abscess  Afebrile with normal vitals  Complains of penile pain postoperatively, occasional bladder spasms  Not feeling febrile  Does endorse a \"normal\" cough    Exam  /51 (BP Location: Left arm)   Pulse 51   Temp 97.1  F (36.2  C) (Oral)   Resp 19   Wt 104.3 kg (230 lb)   SpO2 92%   BMI 29.53 kg/m    No acute distress  Unlabored breathing  Abdomen soft, nontender, nondistended.  Stein with clear yellow urine  Perineal tenderness on exam    UOP NR+725/NR    Labs  WBC 12.8 (6.6)  Hgb 11.8 (14)  Cr 1.07 (1.54)    Blood culture x 2 10/7 - NGTD  UA negative    CT scan of the abdomen:  IMPRESSION  1.  Findings suspicious for prostatitis/prostatic abscess. Consider additional characterization with pelvic MRI.     MRI Pelvis  IMPRESSION: A 2 cm irregular-shaped peripheral enhancing fluid collection centered within the region of the penile base and abutting the inferior aspect of the prostate gland. This is nonspecific, but most likely represents an abscess.    RUG/VCUG  Impression:   Suggestion of trace contrast outside the bulbar urethra is nonspecific  and may be related to bulbourethral (Cowper's) glands, however a small  fistulous tract cannot be excluded. No pooling of contrast to confirm  abscess.     Assessment/Plan  31 year old y/o male with PMHx recent dog bite (treated with clinda/bactrim) admitted 10/8 with concerns of systemic infection. CT/MRI as above. OR cysto revealed membranous urethral stricture and dilated bilateral Cowper's glands, no abscess. Dilated and stein placed.    - continue stein x 1 week. Urology to arrange follow up for trial of void and discussion of next steps in management. Recommend lifting restrictions nothing >10 pounds while stein in place, can return to normal work after stein removed in clinic.  - continue antibiotics per primary team (on zosyn). Recommend broad differential for ongoing elevated " inflammatory markers. No further urologic intervention or imaging follow up indicated for imaging findings - if further intervention desired would defer to IR.  - bladder spasms recs in - PRN B&O, detrol if he wants, pyridium x 3 days, vaseline to tip of penis  - ok to discharge from urologic perspective. Urology will sign off, follow up message sent. If going home with anticholinergics, please ensure pt stops these at least 24 hrs prior to urology follow up    Seen and examined with the chief resident and staff Dr. Lang.    Linda Lui MD, PGY-3  Urology Resident     Contacting the Urology Team     Please use the following job codes to reach the Urology Team. Note that you must use an in house phone and that job codes cannot receive text pages.   On weekdays, dial [phone] (or star-star-star [phone] on the new [employer] telephones) then [phone] to reach the Adult Urology resident or PA on call  On weekdays, dial [phone] (or star-star-star [phone] on the new [employer] telephones) then [phone] to reach the Pediatric Urology resident  On weeknights and weekends, dial [phone] (or star-star-star [phone] on the new [employer] telephones) then [phone] to reach the Urology resident on call (for both Adult and Pediatrics)

## 2021-10-11 VITALS
RESPIRATION RATE: 16 BRPM | TEMPERATURE: 96.2 F | WEIGHT: 230 LBS | DIASTOLIC BLOOD PRESSURE: 72 MMHG | HEART RATE: 48 BPM | SYSTOLIC BLOOD PRESSURE: 116 MMHG | OXYGEN SATURATION: 97 % | BODY MASS INDEX: 29.53 KG/M2

## 2021-10-11 LAB
ANION GAP SERPL CALCULATED.3IONS-SCNC: 7 MMOL/L (ref 3–14)
BUN SERPL-MCNC: 11 MG/DL (ref 7–30)
CALCIUM SERPL-MCNC: 8.5 MG/DL (ref 8.5–10.1)
CHLORIDE BLD-SCNC: 109 MMOL/L (ref 94–109)
CO2 SERPL-SCNC: 25 MMOL/L (ref 20–32)
CREAT SERPL-MCNC: 1.17 MG/DL (ref 0.66–1.25)
ENTEROCOCCUS FAECALIS: NOT DETECTED
ENTEROCOCCUS FAECIUM: NOT DETECTED
ERYTHROCYTE [DISTWIDTH] IN BLOOD BY AUTOMATED COUNT: 12.3 % (ref 10–15)
GFR SERPL CREATININE-BSD FRML MDRD: 84 ML/MIN/1.73M2
GLUCOSE BLD-MCNC: 91 MG/DL (ref 70–99)
HCT VFR BLD AUTO: 36.8 % (ref 40–53)
HGB BLD-MCNC: 12.2 G/DL (ref 13.3–17.7)
LISTERIA SPECIES (DETECTED/NOT DETECTED): NOT DETECTED
MCH RBC QN AUTO: 29.3 PG (ref 26.5–33)
MCHC RBC AUTO-ENTMCNC: 33.2 G/DL (ref 31.5–36.5)
MCV RBC AUTO: 88 FL (ref 78–100)
PLATELET # BLD AUTO: 258 10E3/UL (ref 150–450)
POTASSIUM BLD-SCNC: 3.8 MMOL/L (ref 3.4–5.3)
RBC # BLD AUTO: 4.17 10E6/UL (ref 4.4–5.9)
SODIUM SERPL-SCNC: 141 MMOL/L (ref 133–144)
STAPHYLOCOCCUS AUREUS: NOT DETECTED
STAPHYLOCOCCUS EPIDERMIDIS: NOT DETECTED
STAPHYLOCOCCUS LUGDUNENSIS: NOT DETECTED
STAPHYLOCOCCUS SPECIES: NOT DETECTED
STREPTOCOCCUS AGALACTIAE: NOT DETECTED
STREPTOCOCCUS ANGINOSUS GROUP: NOT DETECTED
STREPTOCOCCUS PNEUMONIAE: NOT DETECTED
STREPTOCOCCUS PYOGENES: NOT DETECTED
STREPTOCOCCUS SPECIES: NOT DETECTED
WBC # BLD AUTO: 8.1 10E3/UL (ref 4–11)

## 2021-10-11 PROCEDURE — 82310 ASSAY OF CALCIUM: CPT | Performed by: INTERNAL MEDICINE

## 2021-10-11 PROCEDURE — 85027 COMPLETE CBC AUTOMATED: CPT | Performed by: INTERNAL MEDICINE

## 2021-10-11 PROCEDURE — 250N000011 HC RX IP 250 OP 636: Performed by: INTERNAL MEDICINE

## 2021-10-11 PROCEDURE — 36415 COLL VENOUS BLD VENIPUNCTURE: CPT | Performed by: INTERNAL MEDICINE

## 2021-10-11 PROCEDURE — 250N000013 HC RX MED GY IP 250 OP 250 PS 637: Performed by: INTERNAL MEDICINE

## 2021-10-11 PROCEDURE — 99238 HOSP IP/OBS DSCHRG MGMT 30/<: CPT | Performed by: INTERNAL MEDICINE

## 2021-10-11 PROCEDURE — 250N000013 HC RX MED GY IP 250 OP 250 PS 637: Performed by: STUDENT IN AN ORGANIZED HEALTH CARE EDUCATION/TRAINING PROGRAM

## 2021-10-11 PROCEDURE — 87040 BLOOD CULTURE FOR BACTERIA: CPT | Performed by: INTERNAL MEDICINE

## 2021-10-11 RX ORDER — OXYCODONE HYDROCHLORIDE 5 MG/1
5 TABLET ORAL EVERY 4 HOURS PRN
Qty: 12 TABLET | Refills: 0 | Status: SHIPPED | OUTPATIENT
Start: 2021-10-11 | End: 2021-10-11

## 2021-10-11 RX ORDER — PHENAZOPYRIDINE HYDROCHLORIDE 100 MG/1
100 TABLET, FILM COATED ORAL 3 TIMES DAILY PRN
Qty: 12 TABLET | Refills: 0 | Status: SHIPPED | OUTPATIENT
Start: 2021-10-11

## 2021-10-11 RX ORDER — ATROPA BELLADONNA AND OPIUM 16.2; 3 MG/1; MG/1
30 SUPPOSITORY RECTAL EVERY 8 HOURS PRN
Qty: 6 SUPPOSITORY | Refills: 0 | Status: SHIPPED | OUTPATIENT
Start: 2021-10-11

## 2021-10-11 RX ADMIN — PIPERACILLIN AND TAZOBACTAM 3.38 G: 3; .375 INJECTION, POWDER, LYOPHILIZED, FOR SOLUTION INTRAVENOUS at 04:24

## 2021-10-11 RX ADMIN — PHENAZOPYRIDINE HYDROCHLORIDE 100 MG: 100 TABLET ORAL at 09:04

## 2021-10-11 RX ADMIN — PHENAZOPYRIDINE HYDROCHLORIDE 100 MG: 100 TABLET ORAL at 11:20

## 2021-10-11 RX ADMIN — AMOXICILLIN AND CLAVULANATE POTASSIUM 1 TABLET: 875; 125 TABLET, FILM COATED ORAL at 09:04

## 2021-10-11 NOTE — PLAN OF CARE
VS: VSS   O2: >90% on RA   Output: Stein in place with adequate output   Last BM: 10/10; +fl   Activity: WBAT; up ind   Up for meals? Yes   Skin: Scabs CDI   Pain: Oxycodone and tylenol available; none today   CMS: Intact   Dressing: None   Diet: Regular   LDA: PIV removed for D.C.    Equipment: IV pole   Plan: Home today   Additional Info: Pt was cleared for discharge to home. Patient was give medication, discharge, follow up, and stein care instructions and state no further questions at this time. Pt was discharged.

## 2021-10-11 NOTE — PLAN OF CARE
VS: BP 92/65 (BP Location: Right arm)   Pulse 64   Temp 97.5  F (36.4  C) (Oral)   Resp 16   Wt 104.3 kg (230 lb)   SpO2 95%   BMI 29.53 kg/m    Pt denies chest pain.    O2: >90% on room air. Pt denies SOB.    Output: Fitch catheter in place with adequate output during shift.    Last BM: 10/10/21 per pt report.    Activity: Independent in room.    Up for meals? N/A   Skin: Intact except scab to R forearm   Pain: Managed with PRN Oxycodone   CMS: Intact. Pt denies numbness or tingling. A&O x4   Dressing: None   Diet: Regular   LDA: PIV R forearm - SL between antibiotics.    Equipment: IV pole/pump, and pt belongings.    Plan: Per ID and Dr. Chen's previous notes, Pt is to discharge to prior living arrangements with PO Augmentin and Fitch in place. Follow up with urology in one week.    Additional Info:

## 2021-10-11 NOTE — DISCHARGE SUMMARY
Service Date: 10/11/2021  Discharge Date: 10/11/2021    HISTORY OF PRESENT ILLNESS:  Kwasi Castellanos is a 31-year-old male without known urologic history, who was admitted to the hospital for evaluation of perineal discomfort, high fever and elevated creatinine in the setting of ongoing treatment for a dog bite to the right thigh area.  The patient was in the midst of therapy with Bactrim and clindamycin at the time of presentation to the ER.  The dog bite had occurred on 09/24/2021 to his right forearm and right posterior thigh and the bite site appeared to be healing well.  Over the few days prior to admission, however, the patient noted pain in the perineal area as well as mild burning with urination and prior to admission had developed a high fever.  In the ER, he was noted to be hypotensive and had a mild to moderately elevated lactic acid level of 2.2.  Creatinine was 1.75 with no previous baseline for comparison. Urinalysis was bland.  White count was 6600.  The patient did have a CT scan of the abdomen and pelvis in the ER, which revealed an ill-defined hypodense area at the base of the penis extending into the prostatic segment of the urethra.  This was suspicious for prostatitis or prostatic abscess.  He subsequently underwent an MRI of the pelvis, which revealed a 2 cm irregular-shaped peripheral enhancing fluid collection within the region of the penile base abutting on the inferior aspect of the prostate gland.  This was felt to be nonspecific, but possibly representing an abscess.    HOSPITAL COURSE:    1.  The patient was admitted to the medical service with Urology consultation for the treatment of what was felt to likely represent prostate abscess.  HE WAS STARTED ON EMPIRIC ANTIBIOTIC THERAPY WITH CEFTRIAXONE AND GENTAMICIN IN VIEW OF A HISTORY OF POSSIBLE ALLERGY TO PENICILLIN.  He was given IV fluids.  The patient was seen by Urology shortly after admission, who felt that the patient's clinical  picture was atypical for prostate abscess, particularly in view of a bland urinalysis.  It was also felt that the MRI findings suggested a fluid collection adjacent to but not involving the prostate gland.  As above, the patient has had no previously known urologic history.  The patient subsequently underwent RUG and VCUG, which revealed narrowing of the penile urethra near the junction of the bulbous urethra, suggesting a mild stricture.  Because of the uncertainty regarding the nature of his MRI findings, high fever and perineal discomfort, the patient did undergo a cystoscopy by Dr. Lang on 10/09/2021.  This revealed a urethral stricture with a trabeculated bladder.  There were prominent Cowper's glands which Urology believes are secondary to the urethral stricture and are the  likely cause for the MRI findings suggestive of a cyst or abscess.  Urology felt, however, that there was no sign of an abscess involving the urethra, bladder or the distended Cowper's glands.  The patient did have a Fitch catheter placed after the procedure.  As above, Urology did not feel that the patient's presentation necessarily represented a urinary tract source.  In the meantime, the patient's fever did resolve after 36 hours of IV antibiotics.  His perineal discomfort resolved within 24 hours of admission and prior to his cystoscopy.  White count did increase to 12,800 on 10/10/2021, but did decrease to 8100 the day prior to discharge.  Infectious Disease was involved throughout his hospital stay.  It remains unclear as to the source of the fever and the perineal discomfort in the setting of urethral stricture with dilated Cowper's glands, but no clear evidence of infection and with a bland urinalysis.        Blood cultures were initially negative however became positive with what appears to be diphtheroids on the fourth day of culture, just prior to patient's discharge.  This was reviewed with infectious disease who believes  this likely is a contaminant.  Repeat blood culture was done prior to discharge and should be followed up when patient is seen by urology in a few days.      Urine for GC chlamydia studies was negative.  The patient's white blood cell count, as above, did resolve with antibiotic therapy as well..  It was felt unlikely that the patient's presentation was related to the dog bite in his right thigh as there was no sign of infection in this area.  IT IS POSSIBLE, THOUGH UNLIKELY, THAT THE PATIENT'S FEVER REPRESENTED AN ANTIBIOTIC ALLERGY TO SEPTRA OR CLINDAMYCIN.  It was recommended by Infectious Disease that the patient be transitioned to Augmentin for a 7-day course and to monitor closely thereafter.  AS ABOVE, THE PATIENT DOES HAVE A HISTORY OF PENICILLIN ALLERGY AS A CHILD, CAUSING A RASH, BUT DID TOLERATE ZOSYN WITHOUT RASH AND IT WAS FELT THAT HE WOULD BE ABLE TO TOLERATE AUGMENTIN.  The patient is aware of the uncertainty regarding the cause for his perineal discomfort and fever and that his status will be closely monitored following the completion of antibiotics.  He will follow up with Urology within the next week for removal of his Fitch catheter.    2.  Elevated creatinine on admission in the setting of acute febrile illness, likely decreased oral intake and concomitant use of Bactrim.  The patient's renal function did resolve with IV fluids.  Creatinine on the day of discharge was 1.17.  The patient was having excellent urine output through his Fitch catheter at the time of discharge.  Oral intake was good.  Patient was advised that he should have repeat renal function assessed in the next few weeks with a primary care provider to clarify baseline renal function.  I would add that CT scan the abdomen pelvis performed on admission revealed no renal abnormalities, no hydronephrosis.    The patient will discharge to home with a 7-day course of Augmentin.  HE WILL MONITOR FOR SIGNS OF ALLERGIC REACTION TO  AUGMENTIN.  He will return to the hospital should he develop recurrence of fever or perineal discomfort or severe diarrhea.       Follow-up will be with urology within the next week for Fitch catheter removal.  Urology will contact patient to schedule an appointment.  He was advised to avoid NSAIDs in view of his recent episode of acute kidney injury.  He will avoid heavy lifting per Urology recommendations.  I also advised patient to obtain primary care in the next few weeks to monitor his overall status particularly in view of the uncertain etiology of his fever and perineal discomfort as well as acute kidney injury.      DISCHARGE MEDICATIONS:  Tylenol on a p.r.n. basis, Augmentin 875/125 one twice daily for 13 more doses, oxycodone 5 mg every 4 hours p.r.n. pain ( dispensed 12), Pyridium 100 mg 3 times a day as needed (dispense 12).    PHYSICAL EXAMINATION:    GENERAL:  Examination on the day of discharge revealed him to be well appearing, in no distress.  LUNGS:  Clear.  HEART:  Regular rate and rhythm.  ABDOMEN:  Soft, nontender.  Fitch catheter was in place, draining adequate urine.  EXTREMITIES:  There is no extremity edema.    SKIN:  The patient did have a small area of raised blisters behind his right ear, the etiology of which was not clear.  The patient was advised to monitor this rash and to seek attention should it become more extensive.      Layo Chen MD        D: 10/11/2021   T: 10/11/2021   MT: [id number]    Name:     BILLIE RITTER  MRN:      [id number]        Account:      [id number]   :      1990           Service Date: 10/11/2021                                  Discharge Date: 10/11/2021     Document: [id number]

## 2021-10-11 NOTE — PROGRESS NOTES
Care Management Follow Up    Length of Stay (days): 3    Expected Discharge Date: 10/11/2021     Concerns to be Addressed:       Patient plan of care discussed at interdisciplinary rounds: Yes    Additional Information:  Patient will need urology appointment this week. Confirmed with urology clinic they will be contacting patient within 2 days to schedule this appointment per Dr. Lui. Phone [phone]. Patient is aware and agreeable to the plan. RNCC available as needed.    Kathleen Thomas RN, BSN  Care Coordinator,  Ortho  Phone [phone]  Pager [phone]

## 2021-10-12 ENCOUNTER — PATIENT OUTREACH (OUTPATIENT)
Dept: CARE COORDINATION | Facility: CLINIC | Age: 31
End: 2021-10-12

## 2021-10-12 DIAGNOSIS — Z71.89 OTHER SPECIFIED COUNSELING: ICD-10-CM

## 2021-10-12 NOTE — PROGRESS NOTES
Clinic Care Coordination Contact  [facility]: Post-Discharge Note  SITUATION                                                      Admission:    Admission Date: 10/07/21   Reason for Admission: Groin Pain  Discharge:   Discharge Date: 10/11/21  Discharge Diagnosis: 1. 2 SIRS criteria, AND2. Suspected infection, AND 3. Organ dysfunction: Lactic Acidosis with value >2.0    BACKGROUND                                                      Kwasi Castellanos is a 31 year old male who presents to the Emergency Department for evaluation of chills and perineum pain.  Patient reports he was bit by a dog on his posterior thigh and volar aspect of his right forearm on 9/24 about 2 weeks ago and then had some general myalgias.  Patient then reports a subjective fever starting on 9/29 (about a week ago).     ASSESSMENT      Enrollment  Primary Care Care Coordination Status: Not a Candidate    Discharge Assessment  How are you doing now that you are home?: doing fine  How are your symptoms? (Red Flag symptoms escalate to triage hotline per guidelines): Improved  Do you feel your condition is stable enough to be safe at home until your provider visit?: Yes  Does the patient have their discharge instructions? : Yes  Does the patient have questions regarding their discharge instructions? : Yes (see comment)  Were you started on any new medications or were there changes to any of your previous medications? : Yes  Does the patient have all of their medications?: Yes  Do you have questions regarding any of your medications? : No  Do you have all of your needed medical supplies or equipment (DME)?  (i.e. oxygen tank, CPAP, cane, etc.): Yes                  PLAN                                                      Outpatient Plan:      No future appointments.  OCT  13  New Visit 10:00 AM  Carmen SHIELDS NP  [facility] Neurosurgery  Clinic [address] [address] [phone]    For any  urgent concerns, please contact our 24 hour nurse triage line: [phone] ([phone])         RUSTY Jackson  [phone]  [facility]

## 2021-10-13 ENCOUNTER — PRE VISIT (OUTPATIENT)
Dept: UROLOGY | Facility: CLINIC | Age: 31
End: 2021-10-13

## 2021-10-14 NOTE — TELEPHONE ENCOUNTER
Action 10/13/2021 DC 10:11pm   Action Taken All Patient records in [facility]. -- JOSETV

## 2021-10-15 LAB
BACTERIA BLD CULT: ABNORMAL

## 2021-10-16 LAB — BACTERIA BLD CULT: NO GROWTH

## 2021-10-17 NOTE — RESULT ENCOUNTER NOTE
Per Discharge Summary note:  Blood cultures were initially negative however became positive with what appears to be diphtheroids on the fourth day of culture, just prior to patient's discharge.  This was reviewed with infectious disease who believes this likely is a contaminant.  Repeat blood culture was done prior to discharge and should be followed up when patient is seen by urology in a few days.

## 2021-10-18 ENCOUNTER — OFFICE VISIT (OUTPATIENT)
Dept: UROLOGY | Facility: CLINIC | Age: 31
End: 2021-10-18
Payer: COMMERCIAL

## 2021-10-18 ENCOUNTER — PRE VISIT (OUTPATIENT)
Dept: UROLOGY | Facility: CLINIC | Age: 31
End: 2021-10-18

## 2021-10-18 DIAGNOSIS — Q64.32 CONGENITAL STRICTURE OF URETHRA: Primary | ICD-10-CM

## 2021-10-18 PROCEDURE — 51798 US URINE CAPACITY MEASURE: CPT

## 2021-10-18 NOTE — TELEPHONE ENCOUNTER
Reason for visit: Follow up     Relevant information: discuss urethral stricture    Records/imaging/labs/orders: in EPIC    Pt called: no    At Rooming: normal

## 2021-10-18 NOTE — PROGRESS NOTES
Chief Complaint   Patient presents with     Allied Health Visit     TOV       Patient Active Problem List   Diagnosis     Acute prostatitis       Allergies   Allergen Reactions     Penicillins      Penicillins Hives     Tolerated zosyn 10/2021       Current Outpatient Medications   Medication Sig Dispense Refill     amoxicillin-clavulanate (AUGMENTIN) 875-125 MG tablet Take 1 tablet by mouth every 12 hours 13 tablet 0     opium-belladonna (B&O SUPPRETTES) 30-16.2 MG per suppository Place 1 suppository rectally every 8 hours as needed for moderate pain 6 suppository 0     phenazopyridine (PYRIDIUM) 100 MG tablet Take 1 tablet (100 mg) by mouth 3 times daily as needed for urinary tract discomfort 12 tablet 0       Social History     Tobacco Use     Smoking status: Never Smoker     Smokeless tobacco: Never Used   Substance Use Topics     Alcohol use: Not Currently     Drug use: Never       Kwasi Castellanos comes into clinic today at the request of  for TOV / catheter removal.    Patient diagnosis: urethral stricture s/p cystoscopy with urethral dilation and stein placement    This service provided today was under the direct supervision of Dr. Corral, who was available if needed.    Kwasi Castellanos presented today for a trial of void.  Approximately 125 mL of normal saline instilled into bladder via catheter.  Patient stated he had urge to urinate and catheter was removed without difficulty.  Patient was given a urinal to measure urine output.  Patient voided approximately 250 mL of monique urine.  PVR 0 mL.    Cipro 500 given per protocol: No. Patient is currently on a course of Augmentin    Patient did tolerate procedure well.    Teaching done with patient verbally as where to call or go if pain, fever, or unable to urinate post catheter removal.    Haja Lovelace EMT  10/18/2021  8:45 AM

## 2021-10-20 ENCOUNTER — OFFICE VISIT (OUTPATIENT)
Dept: UROLOGY | Facility: CLINIC | Age: 31
End: 2021-10-20
Payer: COMMERCIAL

## 2021-10-20 VITALS
BODY MASS INDEX: 29.65 KG/M2 | HEART RATE: 56 BPM | HEIGHT: 74 IN | WEIGHT: 231 LBS | DIASTOLIC BLOOD PRESSURE: 81 MMHG | SYSTOLIC BLOOD PRESSURE: 128 MMHG

## 2021-10-20 DIAGNOSIS — N35.014 POST-TRAUMATIC MALE URETHRAL STRICTURE: Primary | ICD-10-CM

## 2021-10-20 PROCEDURE — 99214 OFFICE O/P EST MOD 30 MIN: CPT | Performed by: UROLOGY

## 2021-10-20 ASSESSMENT — MIFFLIN-ST. JEOR: SCORE: 2082.56

## 2021-10-20 ASSESSMENT — PAIN SCALES - GENERAL: PAINLEVEL: MILD PAIN (2)

## 2021-10-20 NOTE — LETTER
"10/20/2021       RE: Kwasi Castellanos  [address]  [place] [address]     Dear Colleague,    Thank you for referring your patient, Kwasi Castellanos, to the [facility] UROLOGY CLINIC [place] at [facility]. Please see a copy of my visit note below.    HPI:  Kwasi Castellanos is a 31 year old male with urethral stricture  He had an unusual presentation of fever and dilated Cowper's glands.  He went to OR for urethral dilation on 10/9/2021.  He had a stein for a week and had it removed a few days ago.  He is voiding well.    Exam:  /81   Pulse 56   Ht 1.88 m (6' 2\")   Wt 104.8 kg (231 lb)   BMI 29.66 kg/m    NAD  Abdomen soft  Walks easily  RRR      Review of Imaging:  The following imaging exams were independently viewed and interpreted by me and discussed with patient:  I reviewed his VCUG which shows a bulbar urethral stricture of 3-4 cm.      I reviewed labs of Cr 1.17    Assessment & Plan     Urethral stricture with fevers.  Now s/p dilation and recent catheter removal.  I recommend surveillance cystoscopy in 4 months given his sequelae.  It would be a ventral buccal urethroplasty if he recurs.  We discussed the surgery today.      Slade Sutherland MD  Reconstructive Urology  [facility]        Again, thank you for allowing me to participate in the care of your patient.      Sincerely,    Slade Sutherland MD      "

## 2021-10-20 NOTE — LETTER
Date:December 31, 2021      Patient was self referred, no letter generated. Do not send.        [facility] Health Information

## 2021-10-20 NOTE — PATIENT INSTRUCTIONS
Please follow up in four months for a cystoscopy.     It was a pleasure meeting with you today.  Thank you for allowing me and my team the privilege of caring for you today.  YOU are the reason we are here, and I truly hope we provided you with the excellent service you deserve.  Please let us know if there is anything else we can do for you so that we can be sure you are leaving completely satisfied with your care experience.

## 2021-10-20 NOTE — PROGRESS NOTES
"HPI:  Kwasi Castellanos is a 31 year old male with urethral stricture  He had an unusual presentation of fever and dilated Cowper's glands.  He went to OR for urethral dilation on 10/9/2021.  He had a stein for a week and had it removed a few days ago.  He is voiding well.    Exam:  /81   Pulse 56   Ht 1.88 m (6' 2\")   Wt 104.8 kg (231 lb)   BMI 29.66 kg/m    NAD  Abdomen soft  Walks easily  RRR      Review of Imaging:  The following imaging exams were independently viewed and interpreted by me and discussed with patient:  I reviewed his VCUG which shows a bulbar urethral stricture of 3-4 cm.      I reviewed labs of Cr 1.17    Assessment & Plan     Urethral stricture with fevers.  Now s/p dilation and recent catheter removal.  I recommend surveillance cystoscopy in 4 months given his sequelae.  It would be a ventral buccal urethroplasty if he recurs.  We discussed the surgery today.      Slade Sutherland MD  Reconstructive Urology  [facility]    "

## 2021-10-20 NOTE — NURSING NOTE
"Chief Complaint   Patient presents with     Consult     Urethral stricture consult       Blood pressure 128/81, pulse 56, height 1.88 m (6' 2\"), weight 104.8 kg (231 lb). Body mass index is 29.66 kg/m .    Patient Active Problem List   Diagnosis     Acute prostatitis       Allergies   Allergen Reactions     Penicillins      Penicillins Hives     Tolerated zosyn 10/2021       Current Outpatient Medications   Medication Sig Dispense Refill     amoxicillin-clavulanate (AUGMENTIN) 875-125 MG tablet Take 1 tablet by mouth every 12 hours 13 tablet 0     opium-belladonna (B&O SUPPRETTES) 30-16.2 MG per suppository Place 1 suppository rectally every 8 hours as needed for moderate pain 6 suppository 0     phenazopyridine (PYRIDIUM) 100 MG tablet Take 1 tablet (100 mg) by mouth 3 times daily as needed for urinary tract discomfort 12 tablet 0       Social History     Tobacco Use     Smoking status: Never Smoker     Smokeless tobacco: Never Used   Substance Use Topics     Alcohol use: Not Currently     Drug use: Never       Nieves Wisdom CMA  10/20/2021  8:37 AM     "

## 2022-01-24 ENCOUNTER — PRE VISIT (OUTPATIENT)
Dept: UROLOGY | Facility: CLINIC | Age: 32
End: 2022-01-24
Payer: COMMERCIAL

## 2022-01-24 NOTE — TELEPHONE ENCOUNTER
Reason for visit: Cystoscopy     Relevant information: 4 months surveillance; hx of urethral stricture    Records/imaging/labs/orders: in EPIC    Pt called: no    At Rooming: normal

## 2022-02-16 ENCOUNTER — OFFICE VISIT (OUTPATIENT)
Dept: UROLOGY | Facility: CLINIC | Age: 32
End: 2022-02-16
Payer: COMMERCIAL

## 2022-02-16 ENCOUNTER — TELEPHONE (OUTPATIENT)
Dept: UROLOGY | Facility: CLINIC | Age: 32
End: 2022-02-16

## 2022-02-16 VITALS — HEART RATE: 66 BPM | SYSTOLIC BLOOD PRESSURE: 122 MMHG | DIASTOLIC BLOOD PRESSURE: 84 MMHG

## 2022-02-16 DIAGNOSIS — N99.114 POSTPROCEDURAL MALE URETHRAL STRICTURE: Primary | ICD-10-CM

## 2022-02-16 DIAGNOSIS — Q64.32 CONGENITAL STRICTURE OF URETHRA: ICD-10-CM

## 2022-02-16 DIAGNOSIS — N35.014 POST-TRAUMATIC MALE URETHRAL STRICTURE: ICD-10-CM

## 2022-02-16 DIAGNOSIS — Z11.59 ENCOUNTER FOR SCREENING FOR OTHER VIRAL DISEASES: Primary | ICD-10-CM

## 2022-02-16 LAB
ALBUMIN UR-MCNC: NEGATIVE MG/DL
AMORPH CRY #/AREA URNS HPF: ABNORMAL /HPF
APPEARANCE UR: ABNORMAL
BILIRUB UR QL STRIP: NEGATIVE
COLOR UR AUTO: YELLOW
GLUCOSE UR STRIP-MCNC: NEGATIVE MG/DL
HGB UR QL STRIP: NEGATIVE
KETONES UR STRIP-MCNC: NEGATIVE MG/DL
LEUKOCYTE ESTERASE UR QL STRIP: NEGATIVE
MUCOUS THREADS #/AREA URNS LPF: PRESENT /LPF
NITRATE UR QL: NEGATIVE
PH UR STRIP: 6 [PH] (ref 5–7)
RBC URINE: <1 /HPF
SP GR UR STRIP: 1.02 (ref 1–1.03)
SQUAMOUS EPITHELIAL: 1 /HPF
UROBILINOGEN UR STRIP-MCNC: NORMAL MG/DL
WBC URINE: 4 /HPF

## 2022-02-16 PROCEDURE — 52000 CYSTOURETHROSCOPY: CPT | Performed by: UROLOGY

## 2022-02-16 PROCEDURE — 99207 PR NO CHARGE NURSE ONLY: CPT

## 2022-02-16 PROCEDURE — 81001 URINALYSIS AUTO W/SCOPE: CPT | Performed by: PATHOLOGY

## 2022-02-16 PROCEDURE — 87086 URINE CULTURE/COLONY COUNT: CPT | Performed by: UROLOGY

## 2022-02-16 RX ORDER — CLINDAMYCIN PHOSPHATE 900 MG/50ML
900 INJECTION, SOLUTION INTRAVENOUS
Status: CANCELLED | OUTPATIENT
Start: 2022-02-16

## 2022-02-16 ASSESSMENT — PAIN SCALES - GENERAL: PAINLEVEL: NO PAIN (0)

## 2022-02-16 NOTE — LETTER
2/16/2022       RE: Kwasi Castellanos  [address]  [place] [address]     Dear Colleague,    Thank you for referring your patient, Kwasi Castellanos, to the [facility] UROLOGY CLINIC [facility]. Please see a copy of my visit note below.    Cystoscopy    PRE-PROCEDURE DIAGNOSIS: History of urethral stricture  POST-PROCEDURE DIAGNOSIS: urethral stricture  PROCEDURE: Urethroscopy    HISTORY: Kwasi Castellanos is a 31 year old man with history of urethral stricture.  He's relatively healthy.  Back in Oct 2021, he was admitted for fevers and groin pain. Imaging showed a perineal fluid collection abutting the urethra.  VCUG showed 4cm bulbar urethral stricture.  Dr. Lang took him to OR 10/9/2021 where he did cystoscopy, urethral dilation and stein placement.   He notes he voided well after catheter removal but now some symptoms have returned.  He is here for surveillance cystoscopy        REVIEW OF OFFICE STUDIES:        DESCRIPTION OF PROCEDURE:  After informed consent was obtained, the patient was brought to the procedure room where he was placed in the supine position with all pressure points well padded.  He was prepped and draped in a sterile fashion. A flexible cystoscope was introduced through a well-lubricated urethra.  The pendulous urethra was normal. In the bulbar urethra there was a long-appearing 8-10Fr urethral stricture. I could see through it and there was more stricture in the distance. I would estimate 4cm in length    ASSESSMENT AND PLAN:  Recurrent bulbar urethral stricture after endoscopic management. I estimate about 4cm.   I discussed urethroplasty vs. Endoscopic management vs. Conservative.  He wants to do urethroplasty. Will needbuccal graft. I suspect ventral appoach but ultimately depends on location for the distal extent. I suspect mid bulb based on prior VCUG.    Risks, benefits, alternatives discussed.    Slade  MD K[place]

## 2022-02-16 NOTE — PATIENT INSTRUCTIONS
"Please proceed with surgery as planned.    It was a pleasure meeting with you today.  Thank you for allowing me and my team the privilege of caring for you today.  YOU are the reason we are here, and I truly hope we provided you with the excellent service you deserve.  Please let us know if there is anything else we can do for you so that we can be sure you are leaving completely satisfied with your care experience.          AFTER YOUR CYSTOSCOPY        You have just completed a cystoscopy, or \"cysto\", which allowed your physician to learn more about your bladder (or to remove a stent placed after surgery). We suggest that you continue to avoid caffeine, fruit juice, and alcohol for the next 24 hours, however, you are encouraged to return to your normal activities.         A few things that are considered normal after your cystoscopy:     * Small amount of bleeding (or spotting) that clears within the next 24 hours     * Slight burning sensation with urination     * Sensation to of needing to avoid more frequently     * The feeling of \"air\" in your urine     * Mild discomfort that is relieved with Tylenol        Please contact our office promptly if you:     * Develop a fever above 101 degrees     * Are unable to urinate     * Develop bright red blood that does not stop     * Severe pain or swelling         Please contact our office with any concerns or questions @[email or web address].  "

## 2022-02-16 NOTE — PROGRESS NOTES
Cystoscopy    PRE-PROCEDURE DIAGNOSIS: History of urethral stricture  POST-PROCEDURE DIAGNOSIS: urethral stricture  PROCEDURE: Urethroscopy    HISTORY: Kwasi Castellanos is a 31 year old man with history of urethral stricture.  He's relatively healthy.  Back in Oct 2021, he was admitted for fevers and groin pain. Imaging showed a perineal fluid collection abutting the urethra.  VCUG showed 4cm bulbar urethral stricture.  Dr. Lang took him to OR 10/9/2021 where he did cystoscopy, urethral dilation and stein placement.   He notes he voided well after catheter removal but now some symptoms have returned.  He is here for surveillance cystoscopy        REVIEW OF OFFICE STUDIES:        DESCRIPTION OF PROCEDURE:  After informed consent was obtained, the patient was brought to the procedure room where he was placed in the supine position with all pressure points well padded.  He was prepped and draped in a sterile fashion. A flexible cystoscope was introduced through a well-lubricated urethra.  The pendulous urethra was normal. In the bulbar urethra there was a long-appearing 8-10Fr urethral stricture. I could see through it and there was more stricture in the distance. I would estimate 4cm in length    ASSESSMENT AND PLAN:  Recurrent bulbar urethral stricture after endoscopic management. I estimate about 4cm.   I discussed urethroplasty vs. Endoscopic management vs. Conservative.  He wants to do urethroplasty. Will needbuccal graft. I suspect ventral appoach but ultimately depends on location for the distal extent. I suspect mid bulb based on prior VCUG.    Risks, benefits, alternatives discussed.    Slade Sutherland MD

## 2022-02-16 NOTE — CONFIDENTIAL NOTE
Patient is scheduled for surgery with Dr. Sutherland     Spoke with: Maria SANDERS spoke to patient face to face in clinic 02/16/22    Date of Surgery: Thursday 03/03/22    Location: [facility] OR     Informed patient they will need an adult  Yes    Pre op with Provider lexi    H&P: Scheduled with Patient will schedule with PCP     Pre-procedure COVID-19 Test: Monday 02/28/22 [facility] lab     Additional imaging/appointments: na    Surgery packet: Maria SANDERS gave to patient in clinic 02/16/22     Additional comments: na

## 2022-02-17 LAB — BACTERIA UR CULT: NO GROWTH

## 2022-02-18 NOTE — PROGRESS NOTES
Pre Op Teaching Flowsheet                                        Pre and Post op Patient Education  Diagnosis: urethral stricture  Teaching Pre and post op for Urethroplasty with Buccal graft  Person Involved in teaching: Patient      Motivation Level: High  Asks Questions: Yes  Eager to Learn:  Yes  Cooperative: Yes  Receptive (willing/able to accept information):  Yes    Patient demonstrates understanding of the following:  Date of surgery:    Location of surgery:   Pre operative History/Physical other testing prior to surgery:PCP on to be scheduled by patient  No more than 30 days prior to surgery date.   Medications to take the day of surgery: PCP   Blood thinner medications discussed and when to stop (if applicable): PCP   Diabetes medication management (if applicable): PCP    Patient demonstrates understanding of the following:  Patient informed and verbalizes understanding of the need for a responsible adult  and someone to stay with them for the first 24 hours post-operatively: Girl friend  Pre-op bowel prep:  N/A  NPO per Anesthesia Guidelines : Reviewed  Pre-op showering/scrub information with Hibiclens Soap: Yes    Discussed   Pain Management after surgery: pain scale, pain medications techniques  Infection Prevention  Patient instructed on hand hygiene  Surgical procedure site care taught  Signs and symptoms of infection taught  Wound care will be taught at the time of discharge.    Urine anaylsis and Urine Culture to be collected on:2/16/22 no growth  Pre op Covid testing on: 2/28/22  Post-op follow-up:3/22/22  Discussed how to contact the hospital, nurse, and clinic scheduling staff if necessary.     Surgical instructions given to patient in clinic.  Instructional materials: Before your surgery packet.    Total time with patient: 10 minutes  Maria Velazco RN

## 2022-02-28 ENCOUNTER — PRE VISIT (OUTPATIENT)
Dept: UROLOGY | Facility: CLINIC | Age: 32
End: 2022-02-28
Payer: COMMERCIAL

## 2022-02-28 NOTE — TELEPHONE ENCOUNTER
Reason for visit: Consult     Relevant information: s/p urethroplasty    Records/imaging/labs/orders: in EPIC    Pt called: no    At Rooming: normal

## 2022-03-03 ENCOUNTER — HOSPITAL ENCOUNTER (OUTPATIENT)
Facility: AMBULATORY SURGERY CENTER | Age: 32
End: 2022-03-03
Attending: UROLOGY
Payer: COMMERCIAL

## 2022-03-03 DIAGNOSIS — N99.114 POSTPROCEDURAL MALE URETHRAL STRICTURE: ICD-10-CM

## 2024-01-11 NOTE — PLAN OF CARE
"VS: VSS  Blood pressure 99/56, pulse 54, temperature 97.5  F (36.4  C), temperature source Oral, resp. rate 14, weight 104.3 kg (230 lb), SpO2 93 %.     O2: >92% on RA   Output: Stein placed today in surgery  Patent and draining   Last BM: 10/8/21   Activity: Independent, steady gait    Skin: Intact and WNL  Scabs to R forearm and R buttock from prior dog bites   Pain: Penile pain \"feels like a needle is at the tip of my penis\"  Opium-belladonna suppository given with no relief. Declined other pain medications    Prn Detrol available for blaSurdder spasms   Neuro/CMS: A+Ox4  CMS intact. Denies numbness and tingling.    Dressing: None   Diet: Regular diet  No nausea since arriving to unit from PACU   LDA: R forearm PIV infusing LR @ 125ml/hr   Equipment: Personal belongings: Cell phone  Other: IV pump/pole   Plan: Continue abx. Stein will stay in place for 1 week with a follow-up with reconstructive urologist.    Additional Info: Surgery today- cystoscopy, urethral dilation, and stein placed. Arrived to unit from PACU around 1530. Vitals have been stable. Girlfriend at bedside.        " Rest and encourage plenty of fluids.   Start the antibiotics and make sure to give this with food as it can upset his stomach.   Give a probiotic while on the medication.   Good hand washing and wash sores with an antibacterial soap.   Follow up with is PCP in 1 week.

## (undated) DEVICE — FILTER PIRANHA DISP 2228.901

## (undated) DEVICE — PREP SKIN SCRUB TRAY 4461A

## (undated) DEVICE — GLOVE PROTEXIS W/NEU-THERA 7.5  2D73TE75

## (undated) DEVICE — CATH FOLEY 20FR 5ML SILICONE LUBRI-SIL 175820

## (undated) DEVICE — CATH LASER URETERAL 7.1FRX40CM G17797  022403-7.1-40

## (undated) DEVICE — SOL WATER IRRIG 3000ML BAG 2B7117

## (undated) DEVICE — DRAPE LAP W/ARMBOARD 29410

## (undated) DEVICE — NDL SPINAL 18GA 3.5" 405184

## (undated) DEVICE — SUCTION MANIFOLD NEPTUNE 2 SYS 4 PORT 0702-020-000

## (undated) DEVICE — BLADE MORCELLATOR WOLF PIRANHA 4.75X385MM 49700103

## (undated) DEVICE — CATH TRAY FOLEY SURESTEP 16FR WDRAIN BAG STLK LATEX A300316A

## (undated) DEVICE — TUBING SET PIRANHA 41702208

## (undated) DEVICE — PAD CHUX UNDERPAD 30X36" P3036C

## (undated) DEVICE — Device

## (undated) DEVICE — TUBING IRRIG CYSTO/BLADDER SET 81" LF 2C4040

## (undated) DEVICE — GUIDEWIRE SENSOR DUAL FLEX STR 0.035"X150CM M0066703080

## (undated) DEVICE — LIGHT HANDLE X2

## (undated) DEVICE — BLADE KNIFE SURG 11 371111

## (undated) DEVICE — TUBING SET THERMEDX UROLOGY SGL USE LL0006

## (undated) DEVICE — BAG URINARY DRAIN 4000ML LF 153509

## (undated) DEVICE — SOL WATER IRRIG 1000ML BOTTLE 2F7114

## (undated) DEVICE — SYR 50ML CATH TIP W/O NDL 309620

## (undated) DEVICE — CATH URETERAL OPEN END 5FRX70CM M0064002010

## (undated) DEVICE — TUBING SUCTION MEDI-VAC 1/4"X20' N620A

## (undated) DEVICE — ADAPTER SCOPE UROLOK II LF M0067301400

## (undated) DEVICE — SYR 10ML LL W/O NDL 302995

## (undated) DEVICE — WIPES FOLEY CARE SURESTEP PROVON DFC100

## (undated) DEVICE — DRAPE C-ARM W/STRAPS 42X72" 07-CA104

## (undated) DEVICE — DRAPE MAYO STAND 23X54 8337

## (undated) DEVICE — LINEN TOWEL PACK X5 5464

## (undated) DEVICE — GOWN IMPERVIOUS SPECIALTY XLG/XLONG 32474

## (undated) DEVICE — GOWN XLG DISP 9545

## (undated) DEVICE — ESU GROUND PAD UNIVERSAL W/O CORD

## (undated) DEVICE — DRSG DRAIN 4X4" 7086

## (undated) DEVICE — LINEN GOWN X4 5410

## (undated) DEVICE — SYR 50ML LL W/O NDL 309653

## (undated) DEVICE — CATH TRAY FOLEY SURESTEP 16FR W/URINE MTR STATLK LF A303416A

## (undated) DEVICE — SOL NACL 0.9% IRRIG 3000ML BAG 2B7477

## (undated) DEVICE — EVACUATOR BLADDER UROVAC LATEX M0067301250

## (undated) DEVICE — TAPE DURAPORE 3" SILK 1538-3

## (undated) DEVICE — SYR PISTON IRRIGATION 60 ML DYND20325

## (undated) DEVICE — SYR 30ML LL W/O NDL 302832

## (undated) DEVICE — SOL NACL 0.9% IRRIG 1000ML BOTTLE 2F7124

## (undated) DEVICE — SUCTION TIP YANKAUER W/O VENT K86

## (undated) DEVICE — SPECIMEN CONTAINER 5OZ STERILE 2600SA

## (undated) RX ORDER — ATROPA BELLADONNA AND OPIUM 16.2; 3 MG/1; MG/1
SUPPOSITORY RECTAL
Status: DISPENSED
Start: 2021-10-09

## (undated) RX ORDER — ACETAMINOPHEN 325 MG/1
TABLET ORAL
Status: DISPENSED
Start: 2021-10-09

## (undated) RX ORDER — HYDROMORPHONE HYDROCHLORIDE 1 MG/ML
INJECTION, SOLUTION INTRAMUSCULAR; INTRAVENOUS; SUBCUTANEOUS
Status: DISPENSED
Start: 2021-10-09

## (undated) RX ORDER — FENTANYL CITRATE 50 UG/ML
INJECTION, SOLUTION INTRAMUSCULAR; INTRAVENOUS
Status: DISPENSED
Start: 2021-10-09